# Patient Record
Sex: MALE | Race: WHITE | NOT HISPANIC OR LATINO | Employment: OTHER | URBAN - METROPOLITAN AREA
[De-identification: names, ages, dates, MRNs, and addresses within clinical notes are randomized per-mention and may not be internally consistent; named-entity substitution may affect disease eponyms.]

---

## 2019-02-05 ENCOUNTER — HOSPITAL ENCOUNTER (OUTPATIENT)
Dept: CARDIOLOGY | Facility: HOSPITAL | Age: 79
Discharge: HOME | End: 2019-02-05
Attending: SURGERY

## 2020-03-11 ENCOUNTER — HOSPITAL ENCOUNTER (OUTPATIENT)
Dept: CARDIOLOGY | Facility: HOSPITAL | Age: 80
Discharge: HOME | End: 2020-03-11
Attending: SURGERY

## 2020-03-31 ENCOUNTER — HOSPITAL ENCOUNTER (OUTPATIENT)
Dept: SURGERY | Facility: CLINIC | Age: 80
Discharge: HOME | End: 2020-03-31
Attending: UROLOGY

## 2020-03-31 ENCOUNTER — OFFICE VISIT CONVERTED (OUTPATIENT)
Dept: UROLOGY | Facility: CLINIC | Age: 80
End: 2020-03-31
Attending: UROLOGY

## 2020-04-05 LAB
ALBUMIN SERPL-MCNC: 4.2 G/DL (ref 3.5–5)
ALBUMIN/GLOB SERPL: 1.4 {RATIO} (ref 1.4–2.6)
ALP SERPL-CCNC: 95 U/L (ref 56–155)
ALT SERPL-CCNC: 10 U/L (ref 10–40)
ANION GAP SERPL CALC-SCNC: 19 MMOL/L (ref 8–19)
APPEARANCE UR: ABNORMAL
AST SERPL-CCNC: 14 U/L (ref 15–50)
BASOPHILS # BLD AUTO: 0.02 10*3/UL (ref 0–0.2)
BASOPHILS NFR BLD AUTO: 0.2 % (ref 0–3)
BILIRUB SERPL-MCNC: 0.66 MG/DL (ref 0.2–1.3)
BUN SERPL-MCNC: 18 MG/DL (ref 5–25)
BUN/CREAT SERPL: 18 {RATIO} (ref 6–20)
CALCIUM SERPL-MCNC: 9.5 MG/DL (ref 8.7–10.4)
CHLORIDE SERPL-SCNC: 88 MMOL/L (ref 99–111)
COLOR UR: ABNORMAL
CONV ABS IMM GRAN: 0.03 10*3/UL (ref 0–0.2)
CONV BACTERIA: NEGATIVE
CONV CO2: 25 MMOL/L (ref 22–32)
CONV COLLECTION SOURCE (UA): ABNORMAL
CONV IMMATURE GRAN: 0.3 % (ref 0–1.8)
CONV TOTAL PROTEIN: 7.2 G/DL (ref 6.3–8.2)
CREAT UR-MCNC: 0.98 MG/DL (ref 0.7–1.2)
DEPRECATED RDW RBC AUTO: 43.6 FL (ref 35.1–43.9)
EOSINOPHIL # BLD AUTO: 0.01 10*3/UL (ref 0–0.7)
EOSINOPHIL # BLD AUTO: 0.1 % (ref 0–7)
ERYTHROCYTE [DISTWIDTH] IN BLOOD BY AUTOMATED COUNT: 13.3 % (ref 11.6–14.4)
GFR SERPLBLD BASED ON 1.73 SQ M-ARVRAT: >60 ML/MIN/{1.73_M2}
GLOBULIN UR ELPH-MCNC: 3 G/DL (ref 2–3.5)
GLUCOSE SERPL-MCNC: 278 MG/DL (ref 70–99)
HCT VFR BLD AUTO: 43.8 % (ref 42–52)
HGB BLD-MCNC: 14.4 G/DL (ref 14–18)
LYMPHOCYTES # BLD AUTO: 0.96 10*3/UL (ref 1–5)
LYMPHOCYTES NFR BLD AUTO: 8.5 % (ref 20–45)
MCH RBC QN AUTO: 29.2 PG (ref 27–31)
MCHC RBC AUTO-ENTMCNC: 32.9 G/DL (ref 33–37)
MCV RBC AUTO: 88.8 FL (ref 80–96)
MONOCYTES # BLD AUTO: 0.62 10*3/UL (ref 0.2–1.2)
MONOCYTES NFR BLD AUTO: 5.5 % (ref 3–10)
NEUTROPHILS # BLD AUTO: 9.61 10*3/UL (ref 2–8)
NEUTROPHILS NFR BLD AUTO: 85.4 % (ref 30–85)
NRBC CBCN: 0 % (ref 0–0.7)
OSMOLALITY SERPL CALC.SUM OF ELEC: 276 MOSM/KG (ref 273–304)
PLATELET # BLD AUTO: 210 10*3/UL (ref 130–400)
PMV BLD AUTO: 10 FL (ref 9.4–12.4)
POTASSIUM SERPL-SCNC: 5.1 MMOL/L (ref 3.5–5.3)
RBC # BLD AUTO: 4.93 10*6/UL (ref 4.7–6.1)
RBC #/AREA URNS HPF: ABNORMAL /[HPF]
SODIUM SERPL-SCNC: 127 MMOL/L (ref 135–147)
SQUAMOUS SPT QL MICRO: ABNORMAL /[HPF]
WBC # BLD AUTO: 11.25 10*3/UL (ref 4.8–10.8)
WBC #/AREA URNS HPF: ABNORMAL /[HPF]

## 2020-04-06 ENCOUNTER — HOSPITAL ENCOUNTER (OUTPATIENT)
Dept: PERIOP | Facility: HOSPITAL | Age: 80
Setting detail: HOSPITAL OUTPATIENT SURGERY
Discharge: HOME | End: 2020-04-06
Attending: UROLOGY

## 2020-04-06 LAB
GLUCOSE BLD-MCNC: 204 MG/DL (ref 70–99)
GLUCOSE BLD-MCNC: 212 MG/DL (ref 70–99)

## 2020-04-07 LAB — BACTERIA UR CULT: NORMAL

## 2020-04-13 ENCOUNTER — OFFICE VISIT CONVERTED (OUTPATIENT)
Dept: UROLOGY | Facility: CLINIC | Age: 80
End: 2020-04-13
Attending: UROLOGY

## 2020-04-13 ENCOUNTER — CONVERSION ENCOUNTER (OUTPATIENT)
Dept: SURGERY | Facility: CLINIC | Age: 80
End: 2020-04-13

## 2020-04-15 ENCOUNTER — HOSPITAL ENCOUNTER (OUTPATIENT)
Dept: ONCOLOGY | Facility: HOSPITAL | Age: 80
Discharge: HOME | End: 2020-04-15
Attending: INTERNAL MEDICINE

## 2020-04-15 ENCOUNTER — OFFICE VISIT CONVERTED (OUTPATIENT)
Dept: ONCOLOGY | Facility: HOSPITAL | Age: 80
End: 2020-04-15
Attending: INTERNAL MEDICINE

## 2020-04-20 ENCOUNTER — HOSPITAL ENCOUNTER (OUTPATIENT)
Dept: RADIATION ONCOLOGY | Facility: HOSPITAL | Age: 80
Setting detail: RECURRING SERIES
Discharge: STILL ADMITTED OR ADMINISTRATIVE | End: 2020-05-06
Attending: RADIOLOGY

## 2020-04-20 ENCOUNTER — HOSPITAL ENCOUNTER (OUTPATIENT)
Dept: GENERAL RADIOLOGY | Facility: HOSPITAL | Age: 80
Discharge: HOME | End: 2020-04-20
Attending: INTERNAL MEDICINE

## 2020-04-24 ENCOUNTER — TELEPHONE CONVERTED (OUTPATIENT)
Dept: UROLOGY | Facility: CLINIC | Age: 80
End: 2020-04-24
Attending: UROLOGY

## 2020-04-28 LAB — Lab: NORMAL

## 2020-06-30 ENCOUNTER — OFFICE VISIT CONVERTED (OUTPATIENT)
Dept: UROLOGY | Facility: CLINIC | Age: 80
End: 2020-06-30
Attending: UROLOGY

## 2020-08-14 ENCOUNTER — OFFICE VISIT CONVERTED (OUTPATIENT)
Dept: CARDIOLOGY | Facility: CLINIC | Age: 80
End: 2020-08-14
Attending: INTERNAL MEDICINE

## 2020-09-08 ENCOUNTER — OFFICE VISIT CONVERTED (OUTPATIENT)
Dept: UROLOGY | Facility: CLINIC | Age: 80
End: 2020-09-08
Attending: UROLOGY

## 2020-09-29 ENCOUNTER — OFFICE VISIT CONVERTED (OUTPATIENT)
Dept: CARDIOLOGY | Facility: CLINIC | Age: 80
End: 2020-09-29
Attending: INTERNAL MEDICINE

## 2020-09-29 ENCOUNTER — CONVERSION ENCOUNTER (OUTPATIENT)
Dept: CARDIOLOGY | Facility: CLINIC | Age: 80
End: 2020-09-29

## 2020-10-05 ENCOUNTER — HOSPITAL ENCOUNTER (OUTPATIENT)
Dept: SURGERY | Facility: CLINIC | Age: 80
Discharge: HOME | End: 2020-10-05
Attending: UROLOGY

## 2020-10-05 ENCOUNTER — PROCEDURE VISIT CONVERTED (OUTPATIENT)
Dept: UROLOGY | Facility: CLINIC | Age: 80
End: 2020-10-05
Attending: UROLOGY

## 2020-10-08 LAB
AMPICILLIN SUSC ISLT: >=32
AMPICILLIN+SULBAC SUSC ISLT: >=32
BACTERIA UR CULT: ABNORMAL
CEFAZOLIN SUSC ISLT: <=4
CEFEPIME SUSC ISLT: <=0.12
CEFTAZIDIME SUSC ISLT: <=1
CEFTRIAXONE SUSC ISLT: <=0.25
CIPROFLOXACIN SUSC ISLT: >=4
ERTAPENEM SUSC ISLT: <=0.12
GENTAMICIN SUSC ISLT: <=1
LEVOFLOXACIN SUSC ISLT: >=8
NITROFURANTOIN SUSC ISLT: <=16
PIP+TAZO SUSC ISLT: <=4
TMP SMX SUSC ISLT: <=20
TOBRAMYCIN SUSC ISLT: <=1

## 2020-10-17 ENCOUNTER — HOSPITAL ENCOUNTER (OUTPATIENT)
Dept: PREADMISSION TESTING | Facility: HOSPITAL | Age: 80
Discharge: HOME | End: 2020-10-17
Attending: UROLOGY

## 2020-10-18 LAB — SARS-COV-2 RNA SPEC QL NAA+PROBE: NOT DETECTED

## 2020-10-22 ENCOUNTER — HOSPITAL ENCOUNTER (OUTPATIENT)
Dept: PERIOP | Facility: HOSPITAL | Age: 80
Setting detail: HOSPITAL OUTPATIENT SURGERY
Discharge: HOME | End: 2020-10-22
Attending: UROLOGY

## 2020-10-22 LAB
GLUCOSE BLD-MCNC: 173 MG/DL (ref 70–99)
GLUCOSE BLD-MCNC: 183 MG/DL (ref 70–99)

## 2020-11-06 ENCOUNTER — TELEPHONE CONVERTED (OUTPATIENT)
Dept: UROLOGY | Facility: CLINIC | Age: 80
End: 2020-11-06
Attending: UROLOGY

## 2020-12-15 ENCOUNTER — HOSPITAL ENCOUNTER (OUTPATIENT)
Dept: ONCOLOGY | Facility: HOSPITAL | Age: 80
Discharge: HOME | End: 2020-12-15
Attending: INTERNAL MEDICINE

## 2020-12-15 ENCOUNTER — OFFICE VISIT CONVERTED (OUTPATIENT)
Dept: PULMONOLOGY | Facility: CLINIC | Age: 80
End: 2020-12-15
Attending: INTERNAL MEDICINE

## 2020-12-15 LAB
ALBUMIN SERPL-MCNC: 4.3 G/DL (ref 3.5–5)
ALBUMIN/GLOB SERPL: 1.6 {RATIO} (ref 1.4–2.6)
ALP SERPL-CCNC: 89 U/L (ref 56–155)
ALT SERPL-CCNC: 9 U/L (ref 10–40)
ANION GAP SERPL CALC-SCNC: 11 MMOL/L (ref 8–19)
AST SERPL-CCNC: 12 U/L (ref 15–50)
BASOPHILS # BLD AUTO: 0.02 10*3/UL (ref 0–0.2)
BASOPHILS NFR BLD AUTO: 0.2 % (ref 0–3)
BILIRUB SERPL-MCNC: 0.32 MG/DL (ref 0.2–1.3)
BUN SERPL-MCNC: 27 MG/DL (ref 5–25)
BUN/CREAT SERPL: 23 {RATIO} (ref 6–20)
CALCIUM SERPL-MCNC: 9.5 MG/DL (ref 8.7–10.4)
CHLORIDE SERPL-SCNC: 100 MMOL/L (ref 99–111)
CONV ABS IMM GRAN: 0.02 10*3/UL (ref 0–0.54)
CONV CO2: 26 MMOL/L (ref 22–32)
CONV EOSINOPHILS PERCENT BY MANUAL COUNT: 1.4 % (ref 0–7)
CONV IMMATURE GRAN: 0.2 % (ref 0–0.4)
CONV TOTAL PROTEIN: 7 G/DL (ref 6.3–8.2)
CREAT UR-MCNC: 1.18 MG/DL (ref 0.7–1.2)
EOSINOPHIL # BLD MANUAL: 0.15 10*3/UL (ref 0–0.7)
ERYTHROCYTE [DISTWIDTH] IN BLOOD BY AUTOMATED COUNT: 13.6 % (ref 11.5–14.5)
ERYTHROCYTE [DISTWIDTH] IN BLOOD BY AUTOMATED COUNT: 43.6 FL
GFR SERPLBLD BASED ON 1.73 SQ M-ARVRAT: 57 ML/MIN/{1.73_M2}
GLOBULIN UR ELPH-MCNC: 2.7 G/DL (ref 2–3.5)
GLUCOSE SERPL-MCNC: 241 MG/DL (ref 70–99)
HBA1C MFR BLD: 15 G/DL (ref 14–18)
HCT VFR BLD AUTO: 46.4 % (ref 42–52)
LYMPHOCYTES # BLD AUTO: 1.13 10*3/UL (ref 1–5)
LYMPHOCYTES NFR BLD AUTO: 10.2 % (ref 20–45)
MCH RBC QN AUTO: 28 PG (ref 27–31)
MCHC RBC AUTO-ENTMCNC: 32.3 G/DL (ref 33–37)
MCV RBC AUTO: 86.7 FL (ref 80–96)
MONOCYTES # BLD AUTO: 0.68 10*3/UL (ref 0.2–1.2)
MONOCYTES NFR BLD MANUAL: 6.1 % (ref 3–10)
NEUTROPHILS # BLD AUTO: 9.1 10*3/UL (ref 2–8)
NEUTROPHILS NFR BLD MANUAL: 81.9 % (ref 30–85)
OSMOLALITY SERPL CALC.SUM OF ELEC: 287 MOSM/KG (ref 273–304)
PLATELET # BLD AUTO: 201 10*3/UL (ref 130–400)
PMV BLD AUTO: 9.8 FL (ref 7.4–10.4)
POTASSIUM SERPL-SCNC: 4.5 MMOL/L (ref 3.5–5.3)
RBC MORPH BLD: 5.35 10*6/UL (ref 4.7–6.1)
SODIUM SERPL-SCNC: 132 MMOL/L (ref 135–147)
TSH SERPL-ACNC: 2.56 M[IU]/L (ref 0.27–4.2)
WBC # BLD AUTO: 11.1 10*3/UL (ref 4.8–10.8)

## 2020-12-28 ENCOUNTER — HOSPITAL ENCOUNTER (OUTPATIENT)
Dept: OTHER | Facility: HOSPITAL | Age: 80
Setting detail: RECURRING SERIES
Discharge: HOME | End: 2021-03-28
Attending: INTERNAL MEDICINE

## 2020-12-30 LAB
ALBUMIN SERPL-MCNC: 3.7 G/DL (ref 3.5–5)
ALBUMIN/GLOB SERPL: 1.3 {RATIO} (ref 1.4–2.6)
ALP SERPL-CCNC: 82 U/L (ref 56–155)
ALT SERPL-CCNC: 7 U/L (ref 10–40)
ANION GAP SERPL CALC-SCNC: 11 MMOL/L (ref 8–19)
AST SERPL-CCNC: 11 U/L (ref 15–50)
BASOPHILS # BLD AUTO: 0.03 10*3/UL (ref 0–0.2)
BASOPHILS NFR BLD AUTO: 0.4 % (ref 0–3)
BILIRUB SERPL-MCNC: 0.38 MG/DL (ref 0.2–1.3)
BUN SERPL-MCNC: 18 MG/DL (ref 5–25)
BUN/CREAT SERPL: 18 {RATIO} (ref 6–20)
CALCIUM SERPL-MCNC: 9.2 MG/DL (ref 8.7–10.4)
CHLORIDE SERPL-SCNC: 98 MMOL/L (ref 99–111)
CONV ABS IMM GRAN: 0.01 10*3/UL (ref 0–0.54)
CONV CO2: 25 MMOL/L (ref 22–32)
CONV EOSINOPHILS PERCENT BY MANUAL COUNT: 2.6 % (ref 0–7)
CONV IMMATURE GRAN: 0.1 % (ref 0–0.4)
CONV TOTAL PROTEIN: 6.6 G/DL (ref 6.3–8.2)
CREAT UR-MCNC: 1.02 MG/DL (ref 0.7–1.2)
EOSINOPHIL # BLD MANUAL: 0.2 10*3/UL (ref 0–0.7)
ERYTHROCYTE [DISTWIDTH] IN BLOOD BY AUTOMATED COUNT: 14 % (ref 11.5–14.5)
ERYTHROCYTE [DISTWIDTH] IN BLOOD BY AUTOMATED COUNT: 45.1 FL
GFR SERPLBLD BASED ON 1.73 SQ M-ARVRAT: >60 ML/MIN/{1.73_M2}
GLOBULIN UR ELPH-MCNC: 2.9 G/DL (ref 2–3.5)
GLUCOSE SERPL-MCNC: 254 MG/DL (ref 70–99)
HBA1C MFR BLD: 14.1 G/DL (ref 14–18)
HCT VFR BLD AUTO: 42.9 % (ref 42–52)
LYMPHOCYTES # BLD AUTO: 1.61 10*3/UL (ref 1–5)
LYMPHOCYTES NFR BLD AUTO: 21.1 % (ref 20–45)
MCH RBC QN AUTO: 28.7 PG (ref 27–31)
MCHC RBC AUTO-ENTMCNC: 32.9 G/DL (ref 33–37)
MCV RBC AUTO: 87.4 FL (ref 80–96)
MONOCYTES # BLD AUTO: 0.76 10*3/UL (ref 0.2–1.2)
MONOCYTES NFR BLD MANUAL: 10 % (ref 3–10)
NEUTROPHILS # BLD AUTO: 5.02 10*3/UL (ref 2–8)
NEUTROPHILS NFR BLD MANUAL: 65.8 % (ref 30–85)
OSMOLALITY SERPL CALC.SUM OF ELEC: 281 MOSM/KG (ref 273–304)
PLATELET # BLD AUTO: 165 10*3/UL (ref 130–400)
PMV BLD AUTO: 9.7 FL (ref 7.4–10.4)
POTASSIUM SERPL-SCNC: 4.4 MMOL/L (ref 3.5–5.3)
RBC MORPH BLD: 4.91 10*6/UL (ref 4.7–6.1)
SODIUM SERPL-SCNC: 130 MMOL/L (ref 135–147)
T4 FREE SERPL-MCNC: 1.1 NG/DL (ref 0.9–1.8)
TSH SERPL-ACNC: 4.75 M[IU]/L (ref 0.27–4.2)
WBC # BLD AUTO: 7.63 10*3/UL (ref 4.8–10.8)

## 2021-01-01 ENCOUNTER — LAB (OUTPATIENT)
Dept: LAB | Facility: HOSPITAL | Age: 81
End: 2021-01-01

## 2021-01-01 ENCOUNTER — HOSPITAL ENCOUNTER (OUTPATIENT)
Dept: CT IMAGING | Facility: HOSPITAL | Age: 81
Discharge: HOME | End: 2021-03-23
Attending: SURGERY

## 2021-01-01 ENCOUNTER — HOSPITAL ENCOUNTER (EMERGENCY)
Facility: HOSPITAL | Age: 81
Discharge: HOME | End: 2021-10-28
Attending: EMERGENCY MEDICINE | Admitting: EMERGENCY MEDICINE

## 2021-01-01 ENCOUNTER — ANESTHESIA EVENT (OUTPATIENT)
Dept: PERIOP | Facility: HOSPITAL | Age: 81
End: 2021-01-01

## 2021-01-01 ENCOUNTER — PROCEDURE VISIT (OUTPATIENT)
Dept: CARDIAC REHAB | Facility: HOSPITAL | Age: 81
End: 2021-01-01

## 2021-01-01 ENCOUNTER — HOSPITAL ENCOUNTER (OUTPATIENT)
Dept: CARDIOLOGY | Facility: HOSPITAL | Age: 81
Discharge: HOME | End: 2021-03-17
Attending: SURGERY

## 2021-01-01 ENCOUNTER — OFFICE VISIT CONVERTED (OUTPATIENT)
Dept: PULMONOLOGY | Facility: CLINIC | Age: 81
End: 2021-01-01
Attending: INTERNAL MEDICINE

## 2021-01-01 ENCOUNTER — HOSPITAL ENCOUNTER (OUTPATIENT)
Dept: CARDIOLOGY | Facility: HOSPITAL | Age: 81
Discharge: HOME | End: 2021-07-28

## 2021-01-01 ENCOUNTER — ANESTHESIA (OUTPATIENT)
Dept: PERIOP | Facility: HOSPITAL | Age: 81
End: 2021-01-01

## 2021-01-01 ENCOUNTER — OFFICE VISIT (OUTPATIENT)
Dept: UROLOGY | Facility: CLINIC | Age: 81
End: 2021-01-01

## 2021-01-01 ENCOUNTER — OFFICE VISIT CONVERTED (OUTPATIENT)
Dept: PULMONOLOGY | Facility: CLINIC | Age: 81
End: 2021-01-01
Attending: NURSE PRACTITIONER

## 2021-01-01 ENCOUNTER — HOSPITAL ENCOUNTER (OUTPATIENT)
Dept: OTHER | Facility: HOSPITAL | Age: 81
Discharge: HOME | End: 2021-04-05
Attending: INTERNAL MEDICINE

## 2021-01-01 ENCOUNTER — HOSPITAL ENCOUNTER (INPATIENT)
Facility: HOSPITAL | Age: 81
LOS: 1 days | Discharge: HOME | End: 2021-10-31
Attending: EMERGENCY MEDICINE | Admitting: STUDENT IN AN ORGANIZED HEALTH CARE EDUCATION/TRAINING PROGRAM

## 2021-01-01 ENCOUNTER — TELEPHONE (OUTPATIENT)
Dept: CARDIOLOGY | Facility: CLINIC | Age: 81
End: 2021-01-01

## 2021-01-01 ENCOUNTER — HOSPITAL ENCOUNTER (OUTPATIENT)
Dept: ONCOLOGY | Facility: HOSPITAL | Age: 81
Discharge: HOME | End: 2021-04-15
Attending: INTERNAL MEDICINE

## 2021-01-01 ENCOUNTER — OFFICE VISIT (OUTPATIENT)
Dept: CARDIOLOGY | Facility: CLINIC | Age: 81
End: 2021-01-01

## 2021-01-01 ENCOUNTER — HOSPITAL ENCOUNTER (OUTPATIENT)
Dept: RESPIRATORY THERAPY | Facility: HOSPITAL | Age: 81
Discharge: HOME | End: 2021-07-22

## 2021-01-01 ENCOUNTER — HOSPITAL ENCOUNTER (OUTPATIENT)
Dept: CARDIOLOGY | Facility: HOSPITAL | Age: 81
Discharge: HOME | End: 2021-04-12
Attending: SURGERY

## 2021-01-01 ENCOUNTER — TRANSCRIBE ORDERS (OUTPATIENT)
Dept: VASCULAR SURGERY | Facility: HOSPITAL | Age: 81
End: 2021-01-01

## 2021-01-01 ENCOUNTER — PROCEDURE VISIT CONVERTED (OUTPATIENT)
Dept: UROLOGY | Facility: CLINIC | Age: 81
End: 2021-01-01
Attending: UROLOGY

## 2021-01-01 ENCOUNTER — HOSPITAL ENCOUNTER (OUTPATIENT)
Dept: OTHER | Facility: HOSPITAL | Age: 81
Setting detail: RECURRING SERIES
Discharge: HOME | End: 2021-04-07
Attending: INTERNAL MEDICINE

## 2021-01-01 ENCOUNTER — OFFICE VISIT (OUTPATIENT)
Dept: PULMONOLOGY | Facility: CLINIC | Age: 81
End: 2021-01-01

## 2021-01-01 ENCOUNTER — HOSPITAL ENCOUNTER (OUTPATIENT)
Dept: ULTRASOUND IMAGING | Facility: HOSPITAL | Age: 81
Discharge: HOME | End: 2021-10-27

## 2021-01-01 ENCOUNTER — TELEPHONE (OUTPATIENT)
Dept: UROLOGY | Facility: CLINIC | Age: 81
End: 2021-01-01

## 2021-01-01 ENCOUNTER — HOSPITAL ENCOUNTER (OUTPATIENT)
Dept: OTHER | Facility: HOSPITAL | Age: 81
Discharge: HOME | End: 2021-05-28
Attending: NURSE PRACTITIONER

## 2021-01-01 ENCOUNTER — HOSPITAL ENCOUNTER (OUTPATIENT)
Dept: PREADMISSION TESTING | Facility: HOSPITAL | Age: 81
Discharge: HOME | End: 2021-03-18
Attending: SURGERY

## 2021-01-01 ENCOUNTER — TELEPHONE CONVERTED (OUTPATIENT)
Dept: UROLOGY | Facility: CLINIC | Age: 81
End: 2021-01-01
Attending: UROLOGY

## 2021-01-01 ENCOUNTER — HOSPITAL ENCOUNTER (OUTPATIENT)
Dept: GENERAL RADIOLOGY | Facility: HOSPITAL | Age: 81
Discharge: HOME | End: 2021-02-05
Attending: UROLOGY

## 2021-01-01 ENCOUNTER — TRANSCRIBE ORDERS (OUTPATIENT)
Dept: ADMINISTRATIVE | Facility: HOSPITAL | Age: 81
End: 2021-01-01

## 2021-01-01 ENCOUNTER — READMISSION MANAGEMENT (OUTPATIENT)
Dept: CALL CENTER | Facility: HOSPITAL | Age: 81
End: 2021-01-01

## 2021-01-01 ENCOUNTER — APPOINTMENT (OUTPATIENT)
Dept: RESPIRATORY THERAPY | Facility: HOSPITAL | Age: 81
End: 2021-01-01

## 2021-01-01 ENCOUNTER — OFFICE VISIT (OUTPATIENT)
Dept: VASCULAR SURGERY | Facility: HOSPITAL | Age: 81
End: 2021-01-01

## 2021-01-01 VITALS
HEART RATE: 70 BPM | WEIGHT: 213 LBS | SYSTOLIC BLOOD PRESSURE: 130 MMHG | BODY MASS INDEX: 28.85 KG/M2 | HEIGHT: 72 IN | DIASTOLIC BLOOD PRESSURE: 56 MMHG

## 2021-01-01 VITALS
BODY MASS INDEX: 30.41 KG/M2 | WEIGHT: 224.21 LBS | TEMPERATURE: 97.4 F | BODY MASS INDEX: 30.56 KG/M2 | OXYGEN SATURATION: 98 % | WEIGHT: 225.31 LBS | TEMPERATURE: 97 F | BODY MASS INDEX: 28.97 KG/M2 | RESPIRATION RATE: 20 BRPM | TEMPERATURE: 97.6 F | WEIGHT: 213.63 LBS | DIASTOLIC BLOOD PRESSURE: 70 MMHG | SYSTOLIC BLOOD PRESSURE: 145 MMHG | HEART RATE: 83 BPM | WEIGHT: 222.66 LBS | DIASTOLIC BLOOD PRESSURE: 54 MMHG | RESPIRATION RATE: 20 BRPM | SYSTOLIC BLOOD PRESSURE: 140 MMHG | RESPIRATION RATE: 24 BRPM | SYSTOLIC BLOOD PRESSURE: 152 MMHG | RESPIRATION RATE: 18 BRPM | HEART RATE: 75 BPM | SYSTOLIC BLOOD PRESSURE: 138 MMHG | HEART RATE: 70 BPM | TEMPERATURE: 97.8 F | BODY MASS INDEX: 30.2 KG/M2 | DIASTOLIC BLOOD PRESSURE: 52 MMHG | OXYGEN SATURATION: 95 % | OXYGEN SATURATION: 95 % | DIASTOLIC BLOOD PRESSURE: 66 MMHG | OXYGEN SATURATION: 94 % | HEART RATE: 64 BPM

## 2021-01-01 VITALS
TEMPERATURE: 97.4 F | HEART RATE: 77 BPM | SYSTOLIC BLOOD PRESSURE: 141 MMHG | RESPIRATION RATE: 18 BRPM | DIASTOLIC BLOOD PRESSURE: 72 MMHG | OXYGEN SATURATION: 98 %

## 2021-01-01 VITALS
SYSTOLIC BLOOD PRESSURE: 134 MMHG | WEIGHT: 207.01 LBS | RESPIRATION RATE: 16 BRPM | HEART RATE: 84 BPM | DIASTOLIC BLOOD PRESSURE: 67 MMHG | BODY MASS INDEX: 28.04 KG/M2 | OXYGEN SATURATION: 95 % | TEMPERATURE: 97.8 F | HEIGHT: 72 IN

## 2021-01-01 VITALS
RESPIRATION RATE: 20 BRPM | OXYGEN SATURATION: 97 % | TEMPERATURE: 97.3 F | HEART RATE: 66 BPM | SYSTOLIC BLOOD PRESSURE: 145 MMHG | WEIGHT: 218.48 LBS | DIASTOLIC BLOOD PRESSURE: 63 MMHG | SYSTOLIC BLOOD PRESSURE: 145 MMHG | BODY MASS INDEX: 29.9 KG/M2 | HEART RATE: 69 BPM | BODY MASS INDEX: 29.63 KG/M2 | WEIGHT: 220.46 LBS | OXYGEN SATURATION: 92 % | DIASTOLIC BLOOD PRESSURE: 54 MMHG | TEMPERATURE: 98 F | RESPIRATION RATE: 18 BRPM

## 2021-01-01 VITALS
HEIGHT: 69 IN | DIASTOLIC BLOOD PRESSURE: 53 MMHG | OXYGEN SATURATION: 98 % | BODY MASS INDEX: 33.96 KG/M2 | RESPIRATION RATE: 20 BRPM | SYSTOLIC BLOOD PRESSURE: 153 MMHG | WEIGHT: 229.28 LBS | HEART RATE: 70 BPM | TEMPERATURE: 98.1 F

## 2021-01-01 VITALS — BODY MASS INDEX: 28.04 KG/M2 | WEIGHT: 207 LBS | RESPIRATION RATE: 16 BRPM | HEIGHT: 72 IN

## 2021-01-01 VITALS
DIASTOLIC BLOOD PRESSURE: 43 MMHG | RESPIRATION RATE: 14 BRPM | BODY MASS INDEX: 28.31 KG/M2 | SYSTOLIC BLOOD PRESSURE: 123 MMHG | WEIGHT: 209 LBS | HEIGHT: 72 IN | OXYGEN SATURATION: 96 % | HEART RATE: 66 BPM

## 2021-01-01 VITALS — HEIGHT: 72 IN | BODY MASS INDEX: 31.83 KG/M2 | RESPIRATION RATE: 17 BRPM | WEIGHT: 235 LBS

## 2021-01-01 VITALS
RESPIRATION RATE: 17 BRPM | DIASTOLIC BLOOD PRESSURE: 60 MMHG | OXYGEN SATURATION: 97 % | BODY MASS INDEX: 30.66 KG/M2 | SYSTOLIC BLOOD PRESSURE: 116 MMHG | TEMPERATURE: 96.6 F | WEIGHT: 219 LBS | HEART RATE: 68 BPM | HEIGHT: 71 IN

## 2021-01-01 VITALS
HEART RATE: 73 BPM | BODY MASS INDEX: 29.29 KG/M2 | TEMPERATURE: 97.7 F | SYSTOLIC BLOOD PRESSURE: 125 MMHG | DIASTOLIC BLOOD PRESSURE: 50 MMHG | OXYGEN SATURATION: 94 % | RESPIRATION RATE: 18 BRPM | WEIGHT: 216.27 LBS | HEIGHT: 72 IN

## 2021-01-01 VITALS — RESPIRATION RATE: 18 BRPM | WEIGHT: 235.5 LBS | BODY MASS INDEX: 31.9 KG/M2 | HEIGHT: 72 IN

## 2021-01-01 VITALS — WEIGHT: 235 LBS | HEIGHT: 72 IN | RESPIRATION RATE: 16 BRPM | BODY MASS INDEX: 31.83 KG/M2

## 2021-01-01 VITALS
HEART RATE: 66 BPM | DIASTOLIC BLOOD PRESSURE: 70 MMHG | WEIGHT: 224 LBS | BODY MASS INDEX: 30.34 KG/M2 | SYSTOLIC BLOOD PRESSURE: 158 MMHG | HEIGHT: 72 IN

## 2021-01-01 VITALS
HEART RATE: 70 BPM | BODY MASS INDEX: 30.48 KG/M2 | HEIGHT: 72 IN | DIASTOLIC BLOOD PRESSURE: 58 MMHG | WEIGHT: 225 LBS | SYSTOLIC BLOOD PRESSURE: 168 MMHG

## 2021-01-01 DIAGNOSIS — Z53.21 PATIENT LEFT WITHOUT BEING SEEN: Primary | ICD-10-CM

## 2021-01-01 DIAGNOSIS — J45.909 ASTHMA, UNSPECIFIED ASTHMA SEVERITY, UNSPECIFIED WHETHER COMPLICATED, UNSPECIFIED WHETHER PERSISTENT: Primary | ICD-10-CM

## 2021-01-01 DIAGNOSIS — N28.9 ACUTE RENAL INSUFFICIENCY: ICD-10-CM

## 2021-01-01 DIAGNOSIS — I10 ESSENTIAL HYPERTENSION: ICD-10-CM

## 2021-01-01 DIAGNOSIS — C67.9 MALIGNANT NEOPLASM OF URINARY BLADDER, UNSPECIFIED SITE (HCC): Primary | ICD-10-CM

## 2021-01-01 DIAGNOSIS — Z95.1 S/P CABG X 5: ICD-10-CM

## 2021-01-01 DIAGNOSIS — I65.23 BILATERAL CAROTID ARTERY STENOSIS: ICD-10-CM

## 2021-01-01 DIAGNOSIS — I65.23 BILATERAL CAROTID ARTERY STENOSIS: Primary | ICD-10-CM

## 2021-01-01 DIAGNOSIS — R26.2 DIFFICULTY IN WALKING: ICD-10-CM

## 2021-01-01 DIAGNOSIS — J45.909 ASTHMA, UNSPECIFIED ASTHMA SEVERITY, UNSPECIFIED WHETHER COMPLICATED, UNSPECIFIED WHETHER PERSISTENT: ICD-10-CM

## 2021-01-01 DIAGNOSIS — E78.2 MIXED HYPERLIPIDEMIA: ICD-10-CM

## 2021-01-01 DIAGNOSIS — C67.9 MALIGNANT NEOPLASM OF URINARY BLADDER, UNSPECIFIED SITE (HCC): ICD-10-CM

## 2021-01-01 DIAGNOSIS — I25.10 CORONARY ARTERY DISEASE INVOLVING NATIVE CORONARY ARTERY OF NATIVE HEART WITHOUT ANGINA PECTORIS: Primary | ICD-10-CM

## 2021-01-01 DIAGNOSIS — J45.909 ASTHMA IN ADULT WITHOUT COMPLICATION, UNSPECIFIED ASTHMA SEVERITY, UNSPECIFIED WHETHER PERSISTENT: Primary | ICD-10-CM

## 2021-01-01 DIAGNOSIS — R31.0 GROSS HEMATURIA: ICD-10-CM

## 2021-01-01 DIAGNOSIS — D62 ACUTE BLOOD LOSS ANEMIA: ICD-10-CM

## 2021-01-01 DIAGNOSIS — Z95.5 S/P CORONARY ARTERY STENT PLACEMENT: ICD-10-CM

## 2021-01-01 DIAGNOSIS — R31.9 HEMATURIA, UNSPECIFIED TYPE: ICD-10-CM

## 2021-01-01 DIAGNOSIS — R31.0 GROSS HEMATURIA: Primary | ICD-10-CM

## 2021-01-01 DIAGNOSIS — Z85.51 PERSONAL HISTORY OF BLADDER CANCER: ICD-10-CM

## 2021-01-01 LAB
ABO GROUP BLD: NORMAL
ABO GROUP BLD: NORMAL
ALBUMIN SERPL-MCNC: 3.5 G/DL (ref 3.5–5.2)
ALBUMIN SERPL-MCNC: 3.7 G/DL (ref 3.5–5)
ALBUMIN SERPL-MCNC: 3.9 G/DL (ref 3.5–5.2)
ALBUMIN SERPL-MCNC: 4 G/DL (ref 3.5–5)
ALBUMIN/GLOB SERPL: 1.1 {RATIO} (ref 1.4–2.6)
ALBUMIN/GLOB SERPL: 1.1 {RATIO} (ref 1.4–2.6)
ALBUMIN/GLOB SERPL: 1.3 G/DL
ALBUMIN/GLOB SERPL: 1.3 {RATIO} (ref 1.4–2.6)
ALBUMIN/GLOB SERPL: 1.4 G/DL
ALBUMIN/GLOB SERPL: 1.5 {RATIO} (ref 1.4–2.6)
ALP SERPL-CCNC: 67 U/L (ref 39–117)
ALP SERPL-CCNC: 72 U/L (ref 39–117)
ALP SERPL-CCNC: 82 U/L (ref 56–155)
ALP SERPL-CCNC: 86 U/L (ref 56–155)
ALP SERPL-CCNC: 89 U/L (ref 56–155)
ALP SERPL-CCNC: 94 U/L (ref 56–155)
ALT SERPL W P-5'-P-CCNC: 6 U/L (ref 1–41)
ALT SERPL W P-5'-P-CCNC: 7 U/L (ref 1–41)
ALT SERPL-CCNC: 10 U/L (ref 10–40)
ALT SERPL-CCNC: 12 U/L (ref 10–40)
ALT SERPL-CCNC: 12 U/L (ref 10–40)
ALT SERPL-CCNC: 13 U/L (ref 10–40)
ANION GAP SERPL CALC-SCNC: 12 MMOL/L (ref 8–19)
ANION GAP SERPL CALC-SCNC: 12 MMOL/L (ref 8–19)
ANION GAP SERPL CALC-SCNC: 13 MMOL/L (ref 8–19)
ANION GAP SERPL CALC-SCNC: 18 MMOL/L (ref 8–19)
ANION GAP SERPL CALCULATED.3IONS-SCNC: 11.5 MMOL/L (ref 5–15)
ANION GAP SERPL CALCULATED.3IONS-SCNC: 12 MMOL/L (ref 5–15)
ANION GAP SERPL CALCULATED.3IONS-SCNC: 12 MMOL/L (ref 5–15)
ANION GAP SERPL CALCULATED.3IONS-SCNC: 15 MMOL/L (ref 5–15)
APTT BLD: 25.6 S (ref 22.2–34.2)
APTT PPP: 23.4 SECONDS (ref 22.2–34.2)
AST SERPL-CCNC: 12 U/L (ref 1–40)
AST SERPL-CCNC: 12 U/L (ref 1–40)
AST SERPL-CCNC: 14 U/L (ref 15–50)
AST SERPL-CCNC: 18 U/L (ref 15–50)
AST SERPL-CCNC: 18 U/L (ref 15–50)
AST SERPL-CCNC: 23 U/L (ref 15–50)
BACTERIA SPEC AEROBE CULT: NO GROWTH
BACTERIA SPEC AEROBE CULT: NORMAL
BACTERIA SPEC AEROBE CULT: NORMAL
BACTERIA UR QL AUTO: ABNORMAL /HPF
BASOPHILS # BLD AUTO: 0.01 10*3/MM3 (ref 0–0.2)
BASOPHILS # BLD AUTO: 0.02 10*3/MM3 (ref 0–0.2)
BASOPHILS # BLD AUTO: 0.03 10*3/UL (ref 0–0.2)
BASOPHILS # BLD AUTO: 0.03 10*3/UL (ref 0–0.2)
BASOPHILS # BLD AUTO: 0.04 10*3/MM3 (ref 0–0.2)
BASOPHILS # BLD AUTO: 0.04 10*3/UL (ref 0–0.2)
BASOPHILS # BLD AUTO: 0.07 10*3/UL (ref 0–0.2)
BASOPHILS NFR BLD AUTO: 0.1 % (ref 0–1.5)
BASOPHILS NFR BLD AUTO: 0.2 % (ref 0–1.5)
BASOPHILS NFR BLD AUTO: 0.4 % (ref 0–1.5)
BASOPHILS NFR BLD AUTO: 0.4 % (ref 0–3)
BASOPHILS NFR BLD AUTO: 0.4 % (ref 0–3)
BASOPHILS NFR BLD AUTO: 0.6 % (ref 0–3)
BASOPHILS NFR BLD AUTO: 1 % (ref 0–3)
BH CV XLRA MEAS LEFT CAROTID BULB EDV: 11 CM/SEC
BH CV XLRA MEAS LEFT CAROTID BULB PSV: 78 CM/SEC
BH CV XLRA MEAS LEFT DIST CCA EDV: 13 CM/SEC
BH CV XLRA MEAS LEFT DIST CCA PSV: 101 CM/SEC
BH CV XLRA MEAS LEFT DIST ICA EDV: 24 CM/SEC
BH CV XLRA MEAS LEFT DIST ICA PSV: 100 CM/SEC
BH CV XLRA MEAS LEFT MID ICA EDV: 17 CM/SEC
BH CV XLRA MEAS LEFT MID ICA PSV: 100 CM/SEC
BH CV XLRA MEAS LEFT PROX CCA EDV: 9 CM/SEC
BH CV XLRA MEAS LEFT PROX CCA PSV: 103 CM/SEC
BH CV XLRA MEAS LEFT PROX ECA EDV: 11 CM/SEC
BH CV XLRA MEAS LEFT PROX ECA PSV: 119 CM/SEC
BH CV XLRA MEAS LEFT PROX ICA EDV: 17 CM/SEC
BH CV XLRA MEAS LEFT PROX ICA PSV: 103 CM/SEC
BH CV XLRA MEAS LEFT VERTEBRAL A EDV: 9 CM/SEC
BH CV XLRA MEAS LEFT VERTEBRAL A PSV: 39 CM/SEC
BH CV XLRA MEAS RIGHT CAROTID BULB EDV: 9 CM/SEC
BH CV XLRA MEAS RIGHT CAROTID BULB PSV: 47 CM/SEC
BH CV XLRA MEAS RIGHT DIST CCA EDV: 14 CM/SEC
BH CV XLRA MEAS RIGHT DIST CCA PSV: 81 CM/SEC
BH CV XLRA MEAS RIGHT DIST ICA EDV: 14 CM/SEC
BH CV XLRA MEAS RIGHT DIST ICA PSV: 76 CM/SEC
BH CV XLRA MEAS RIGHT MID ICA EDV: 14 CM/SEC
BH CV XLRA MEAS RIGHT MID ICA PSV: 65 CM/SEC
BH CV XLRA MEAS RIGHT PROX CCA EDV: 12 CM/SEC
BH CV XLRA MEAS RIGHT PROX CCA PSV: 98 CM/SEC
BH CV XLRA MEAS RIGHT PROX ECA EDV: 13 CM/SEC
BH CV XLRA MEAS RIGHT PROX ECA PSV: 103 CM/SEC
BH CV XLRA MEAS RIGHT PROX ICA EDV: 9 CM/SEC
BH CV XLRA MEAS RIGHT PROX ICA PSV: 35 CM/SEC
BH CV XLRA MEAS RIGHT VERTEBRAL A EDV: 12 CM/SEC
BH CV XLRA MEAS RIGHT VERTEBRAL A PSV: 48 CM/SEC
BILIRUB BLD-MCNC: NEGATIVE MG/DL
BILIRUB SERPL-MCNC: 0.2 MG/DL (ref 0–1.2)
BILIRUB SERPL-MCNC: 0.31 MG/DL (ref 0.2–1.3)
BILIRUB SERPL-MCNC: 0.31 MG/DL (ref 0.2–1.3)
BILIRUB SERPL-MCNC: 0.33 MG/DL (ref 0.2–1.3)
BILIRUB SERPL-MCNC: 0.35 MG/DL (ref 0.2–1.3)
BILIRUB SERPL-MCNC: 0.4 MG/DL (ref 0–1.2)
BILIRUB UR QL STRIP: ABNORMAL
BLD GP AB SCN SERPL QL: NEGATIVE
BUN SERPL-MCNC: 18 MG/DL (ref 5–25)
BUN SERPL-MCNC: 19 MG/DL (ref 5–25)
BUN SERPL-MCNC: 20 MG/DL (ref 5–25)
BUN SERPL-MCNC: 23 MG/DL (ref 5–25)
BUN SERPL-MCNC: 36 MG/DL (ref 8–23)
BUN SERPL-MCNC: 36 MG/DL (ref 8–23)
BUN SERPL-MCNC: 39 MG/DL (ref 8–23)
BUN SERPL-MCNC: 40 MG/DL (ref 8–23)
BUN/CREAT SERPL: 16 {RATIO} (ref 6–20)
BUN/CREAT SERPL: 17 {RATIO} (ref 6–20)
BUN/CREAT SERPL: 19 {RATIO} (ref 6–20)
BUN/CREAT SERPL: 19 {RATIO} (ref 6–20)
BUN/CREAT SERPL: 19.6 (ref 7–25)
BUN/CREAT SERPL: 19.7 (ref 7–25)
BUN/CREAT SERPL: 19.9 (ref 7–25)
BUN/CREAT SERPL: 20.3 (ref 7–25)
CALCIUM SERPL-MCNC: 9 MG/DL (ref 8.7–10.4)
CALCIUM SERPL-MCNC: 9.4 MG/DL (ref 8.7–10.4)
CALCIUM SERPL-MCNC: 9.6 MG/DL (ref 8.7–10.4)
CALCIUM SERPL-MCNC: 9.7 MG/DL (ref 8.7–10.4)
CALCIUM SPEC-SCNC: 8.4 MG/DL (ref 8.6–10.5)
CALCIUM SPEC-SCNC: 8.6 MG/DL (ref 8.6–10.5)
CALCIUM SPEC-SCNC: 9.1 MG/DL (ref 8.6–10.5)
CALCIUM SPEC-SCNC: 9.3 MG/DL (ref 8.6–10.5)
CHLORIDE SERPL-SCNC: 100 MMOL/L (ref 99–111)
CHLORIDE SERPL-SCNC: 100 MMOL/L (ref 99–111)
CHLORIDE SERPL-SCNC: 89 MMOL/L (ref 98–107)
CHLORIDE SERPL-SCNC: 90 MMOL/L (ref 98–107)
CHLORIDE SERPL-SCNC: 94 MMOL/L (ref 99–111)
CHLORIDE SERPL-SCNC: 96 MMOL/L (ref 98–107)
CHLORIDE SERPL-SCNC: 97 MMOL/L (ref 98–107)
CHLORIDE SERPL-SCNC: 98 MMOL/L (ref 99–111)
CLARITY UR: ABNORMAL
CLARITY, POC: CLEAR
CO2 SERPL-SCNC: 19 MMOL/L (ref 22–29)
CO2 SERPL-SCNC: 20 MMOL/L (ref 22–29)
CO2 SERPL-SCNC: 21.5 MMOL/L (ref 22–29)
CO2 SERPL-SCNC: 26 MMOL/L (ref 22–29)
COLOR UR: ABNORMAL
COLOR UR: YELLOW
CONV ABS IMM GRAN: 0.01 10*3/UL (ref 0–0.54)
CONV ABS IMM GRAN: 0.01 10*3/UL (ref 0–0.54)
CONV ABS IMM GRAN: 0.03 10*3/UL (ref 0–0.54)
CONV ABS IMM GRAN: 0.04 10*3/UL (ref 0–0.2)
CONV CO2: 26 MMOL/L (ref 22–32)
CONV CO2: 27 MMOL/L (ref 22–32)
CONV EOSINOPHILS PERCENT BY MANUAL COUNT: 6.9 % (ref 0–7)
CONV EOSINOPHILS PERCENT BY MANUAL COUNT: 6.9 % (ref 0–7)
CONV EOSINOPHILS PERCENT BY MANUAL COUNT: 7.4 % (ref 0–7)
CONV IMMATURE GRAN: 0.1 % (ref 0–0.4)
CONV IMMATURE GRAN: 0.1 % (ref 0–0.4)
CONV IMMATURE GRAN: 0.4 % (ref 0–0.4)
CONV IMMATURE GRAN: 0.5 % (ref 0–1.8)
CONV TOTAL PROTEIN: 6.6 G/DL (ref 6.3–8.2)
CONV TOTAL PROTEIN: 7.1 G/DL (ref 6.3–8.2)
CONV TOTAL PROTEIN: 7.2 G/DL (ref 6.3–8.2)
CONV TOTAL PROTEIN: 7.6 G/DL (ref 6.3–8.2)
CREAT BLD-MCNC: 1.1 MG/DL (ref 0.6–1.4)
CREAT SERPL-MCNC: 1.81 MG/DL (ref 0.76–1.27)
CREAT SERPL-MCNC: 1.84 MG/DL (ref 0.76–1.27)
CREAT SERPL-MCNC: 1.92 MG/DL (ref 0.76–1.27)
CREAT SERPL-MCNC: 2.03 MG/DL (ref 0.76–1.27)
CREAT UR-MCNC: 1.01 MG/DL (ref 0.7–1.2)
CREAT UR-MCNC: 1.13 MG/DL (ref 0.7–1.2)
CREAT UR-MCNC: 1.18 MG/DL (ref 0.7–1.2)
CREAT UR-MCNC: 1.2 MG/DL (ref 0.7–1.2)
DEPRECATED RDW RBC AUTO: 41.9 FL (ref 37–54)
DEPRECATED RDW RBC AUTO: 42.6 FL (ref 37–54)
DEPRECATED RDW RBC AUTO: 43 FL (ref 37–54)
DEPRECATED RDW RBC AUTO: 47 FL (ref 35.1–43.9)
EOSINOPHIL # BLD AUTO: 0 10*3/MM3 (ref 0–0.4)
EOSINOPHIL # BLD AUTO: 0.1 10*3/MM3 (ref 0–0.4)
EOSINOPHIL # BLD AUTO: 0.17 10*3/MM3 (ref 0–0.4)
EOSINOPHIL # BLD AUTO: 0.58 10*3/UL (ref 0–0.7)
EOSINOPHIL # BLD AUTO: 6.8 % (ref 0–7)
EOSINOPHIL # BLD MANUAL: 0.49 10*3/UL (ref 0–0.7)
EOSINOPHIL # BLD MANUAL: 0.51 10*3/UL (ref 0–0.7)
EOSINOPHIL # BLD MANUAL: 0.51 10*3/UL (ref 0–0.7)
EOSINOPHIL NFR BLD AUTO: 0 % (ref 0.3–6.2)
EOSINOPHIL NFR BLD AUTO: 0.9 % (ref 0.3–6.2)
EOSINOPHIL NFR BLD AUTO: 1.6 % (ref 0.3–6.2)
ERYTHROCYTE [DISTWIDTH] IN BLOOD BY AUTOMATED COUNT: 13.2 % (ref 12.3–15.4)
ERYTHROCYTE [DISTWIDTH] IN BLOOD BY AUTOMATED COUNT: 13.4 % (ref 12.3–15.4)
ERYTHROCYTE [DISTWIDTH] IN BLOOD BY AUTOMATED COUNT: 13.4 % (ref 12.3–15.4)
ERYTHROCYTE [DISTWIDTH] IN BLOOD BY AUTOMATED COUNT: 14.3 % (ref 11.5–14.5)
ERYTHROCYTE [DISTWIDTH] IN BLOOD BY AUTOMATED COUNT: 14.5 % (ref 11.5–14.5)
ERYTHROCYTE [DISTWIDTH] IN BLOOD BY AUTOMATED COUNT: 14.5 % (ref 11.5–14.5)
ERYTHROCYTE [DISTWIDTH] IN BLOOD BY AUTOMATED COUNT: 14.5 % (ref 11.6–14.4)
ERYTHROCYTE [DISTWIDTH] IN BLOOD BY AUTOMATED COUNT: 45.4 FL
ERYTHROCYTE [DISTWIDTH] IN BLOOD BY AUTOMATED COUNT: 46 FL
ERYTHROCYTE [DISTWIDTH] IN BLOOD BY AUTOMATED COUNT: 47.1 FL
EXPIRATION DATE: 822
GFR SERPL CREATININE-BSD FRML MDRD: 32 ML/MIN/1.73
GFR SERPL CREATININE-BSD FRML MDRD: 34 ML/MIN/1.73
GFR SERPL CREATININE-BSD FRML MDRD: 35 ML/MIN/1.73
GFR SERPL CREATININE-BSD FRML MDRD: 36 ML/MIN/1.73
GFR SERPLBLD BASED ON 1.73 SQ M-ARVRAT: 56 ML/MIN/{1.73_M2}
GFR SERPLBLD BASED ON 1.73 SQ M-ARVRAT: 57 ML/MIN/{1.73_M2}
GFR SERPLBLD BASED ON 1.73 SQ M-ARVRAT: >60 ML/MIN/{1.73_M2}
GLOBULIN UR ELPH-MCNC: 2.6 G/DL (ref 2–3.5)
GLOBULIN UR ELPH-MCNC: 2.8 GM/DL
GLOBULIN UR ELPH-MCNC: 2.8 GM/DL
GLOBULIN UR ELPH-MCNC: 3.2 G/DL (ref 2–3.5)
GLOBULIN UR ELPH-MCNC: 3.4 G/DL (ref 2–3.5)
GLOBULIN UR ELPH-MCNC: 3.6 G/DL (ref 2–3.5)
GLUCOSE BLDC GLUCOMTR-MCNC: 181 MG/DL (ref 70–99)
GLUCOSE BLDC GLUCOMTR-MCNC: 196 MG/DL (ref 70–99)
GLUCOSE BLDC GLUCOMTR-MCNC: 219 MG/DL (ref 70–99)
GLUCOSE BLDC GLUCOMTR-MCNC: 278 MG/DL (ref 70–99)
GLUCOSE BLDC GLUCOMTR-MCNC: 304 MG/DL (ref 70–99)
GLUCOSE SERPL-MCNC: 159 MG/DL (ref 70–99)
GLUCOSE SERPL-MCNC: 211 MG/DL (ref 70–99)
GLUCOSE SERPL-MCNC: 213 MG/DL (ref 65–99)
GLUCOSE SERPL-MCNC: 234 MG/DL (ref 65–99)
GLUCOSE SERPL-MCNC: 243 MG/DL (ref 70–99)
GLUCOSE SERPL-MCNC: 244 MG/DL (ref 65–99)
GLUCOSE SERPL-MCNC: 284 MG/DL (ref 65–99)
GLUCOSE SERPL-MCNC: 80 MG/DL (ref 70–99)
GLUCOSE UR STRIP-MCNC: ABNORMAL MG/DL
HBA1C MFR BLD: 13.6 G/DL (ref 14–18)
HBA1C MFR BLD: 14.8 G/DL (ref 14–18)
HBA1C MFR BLD: 14.9 G/DL (ref 14–18)
HCT VFR BLD AUTO: 26.8 % (ref 37.5–51)
HCT VFR BLD AUTO: 27.8 % (ref 37.5–51)
HCT VFR BLD AUTO: 29.8 % (ref 37.5–51)
HCT VFR BLD AUTO: 29.8 % (ref 37.5–51)
HCT VFR BLD AUTO: 30.3 % (ref 37.5–51)
HCT VFR BLD AUTO: 35.9 % (ref 37.5–51)
HCT VFR BLD AUTO: 41.2 % (ref 42–52)
HCT VFR BLD AUTO: 42.3 % (ref 42–52)
HCT VFR BLD AUTO: 43.9 % (ref 42–52)
HCT VFR BLD AUTO: 45.4 % (ref 42–52)
HGB BLD-MCNC: 10.4 G/DL (ref 13–17.7)
HGB BLD-MCNC: 12.2 G/DL (ref 13–17.7)
HGB BLD-MCNC: 13.4 G/DL (ref 14–18)
HGB BLD-MCNC: 9.2 G/DL (ref 13–17.7)
HGB BLD-MCNC: 9.5 G/DL (ref 13–17.7)
HGB BLD-MCNC: 9.8 G/DL (ref 13–17.7)
HGB BLD-MCNC: 9.8 G/DL (ref 13–17.7)
HGB UR QL STRIP.AUTO: ABNORMAL
HOLD SPECIMEN: NORMAL
HOLD SPECIMEN: NORMAL
HYALINE CASTS UR QL AUTO: ABNORMAL /LPF
IMM GRANULOCYTES # BLD AUTO: 0.03 10*3/MM3 (ref 0–0.05)
IMM GRANULOCYTES # BLD AUTO: 0.04 10*3/MM3 (ref 0–0.05)
IMM GRANULOCYTES # BLD AUTO: 0.1 10*3/MM3 (ref 0–0.05)
IMM GRANULOCYTES NFR BLD AUTO: 0.3 % (ref 0–0.5)
IMM GRANULOCYTES NFR BLD AUTO: 0.4 % (ref 0–0.5)
IMM GRANULOCYTES NFR BLD AUTO: 1.1 % (ref 0–0.5)
INR PPP: 0.89 (ref 2–3)
INR PPP: 0.9 (ref 2–3)
KETONES UR QL STRIP: ABNORMAL
KETONES UR QL: NEGATIVE
LEFT ARM BP: NORMAL MMHG
LEUKOCYTE EST, POC: ABNORMAL
LEUKOCYTE ESTERASE UR QL STRIP.AUTO: ABNORMAL
LYMPHOCYTES # BLD AUTO: 0.67 10*3/MM3 (ref 0.7–3.1)
LYMPHOCYTES # BLD AUTO: 0.91 10*3/MM3 (ref 0.7–3.1)
LYMPHOCYTES # BLD AUTO: 1.18 10*3/MM3 (ref 0.7–3.1)
LYMPHOCYTES # BLD AUTO: 1.33 10*3/UL (ref 1–5)
LYMPHOCYTES # BLD AUTO: 1.46 10*3/UL (ref 1–5)
LYMPHOCYTES # BLD AUTO: 1.64 10*3/UL (ref 1–5)
LYMPHOCYTES # BLD AUTO: 1.92 10*3/UL (ref 1–5)
LYMPHOCYTES NFR BLD AUTO: 10.8 % (ref 19.6–45.3)
LYMPHOCYTES NFR BLD AUTO: 19.2 % (ref 20–45)
LYMPHOCYTES NFR BLD AUTO: 19.4 % (ref 20–45)
LYMPHOCYTES NFR BLD AUTO: 19.8 % (ref 20–45)
LYMPHOCYTES NFR BLD AUTO: 26.9 % (ref 20–45)
LYMPHOCYTES NFR BLD AUTO: 7.1 % (ref 19.6–45.3)
LYMPHOCYTES NFR BLD AUTO: 8.8 % (ref 19.6–45.3)
Lab: ABNORMAL
MAGNESIUM SERPL-MCNC: 2 MG/DL (ref 1.6–2.4)
MAXIMAL PREDICTED HEART RATE: 138 BPM
MCH RBC QN AUTO: 28.2 PG (ref 27–31)
MCH RBC QN AUTO: 28.6 PG (ref 27–31)
MCH RBC QN AUTO: 28.7 PG (ref 26.6–33)
MCH RBC QN AUTO: 29 PG (ref 27–31)
MCH RBC QN AUTO: 29.2 PG (ref 27–31)
MCH RBC QN AUTO: 29.5 PG (ref 26.6–33)
MCH RBC QN AUTO: 29.7 PG (ref 26.6–33)
MCHC RBC AUTO-ENTMCNC: 31.7 G/DL (ref 33–37)
MCHC RBC AUTO-ENTMCNC: 32.6 G/DL (ref 33–37)
MCHC RBC AUTO-ENTMCNC: 32.9 G/DL (ref 31.5–35.7)
MCHC RBC AUTO-ENTMCNC: 33 G/DL (ref 33–37)
MCHC RBC AUTO-ENTMCNC: 33.9 G/DL (ref 33–37)
MCHC RBC AUTO-ENTMCNC: 34 G/DL (ref 31.5–35.7)
MCHC RBC AUTO-ENTMCNC: 34.2 G/DL (ref 31.5–35.7)
MCV RBC AUTO: 86.1 FL (ref 80–96)
MCV RBC AUTO: 86.3 FL (ref 79–97)
MCV RBC AUTO: 86.6 FL (ref 80–96)
MCV RBC AUTO: 87.3 FL (ref 79–97)
MCV RBC AUTO: 87.4 FL (ref 79–97)
MCV RBC AUTO: 87.8 FL (ref 80–96)
MCV RBC AUTO: 90.4 FL (ref 80–96)
MONOCYTES # BLD AUTO: 0.24 10*3/MM3 (ref 0.1–0.9)
MONOCYTES # BLD AUTO: 0.53 10*3/UL (ref 0.2–1.2)
MONOCYTES # BLD AUTO: 0.54 10*3/UL (ref 0.2–1.2)
MONOCYTES # BLD AUTO: 0.64 10*3/MM3 (ref 0.1–0.9)
MONOCYTES # BLD AUTO: 0.72 10*3/UL (ref 0.2–1.2)
MONOCYTES # BLD AUTO: 0.73 10*3/MM3 (ref 0.1–0.9)
MONOCYTES # BLD AUTO: 0.76 10*3/UL (ref 0.2–1.2)
MONOCYTES NFR BLD AUTO: 2.5 % (ref 5–12)
MONOCYTES NFR BLD AUTO: 6.2 % (ref 5–12)
MONOCYTES NFR BLD AUTO: 6.7 % (ref 5–12)
MONOCYTES NFR BLD AUTO: 8.4 % (ref 3–10)
MONOCYTES NFR BLD MANUAL: 10.6 % (ref 3–10)
MONOCYTES NFR BLD MANUAL: 7.2 % (ref 3–10)
MONOCYTES NFR BLD MANUAL: 7.9 % (ref 3–10)
NEUTROPHILS # BLD AUTO: 3.89 10*3/UL (ref 2–8)
NEUTROPHILS # BLD AUTO: 4.42 10*3/UL (ref 2–8)
NEUTROPHILS # BLD AUTO: 4.82 10*3/UL (ref 2–8)
NEUTROPHILS # BLD AUTO: 5.53 10*3/UL (ref 2–8)
NEUTROPHILS NFR BLD AUTO: 64.7 % (ref 30–85)
NEUTROPHILS NFR BLD AUTO: 8.48 10*3/MM3 (ref 1.7–7)
NEUTROPHILS NFR BLD AUTO: 8.59 10*3/MM3 (ref 1.7–7)
NEUTROPHILS NFR BLD AUTO: 8.89 10*3/MM3 (ref 1.7–7)
NEUTROPHILS NFR BLD AUTO: 81 % (ref 42.7–76)
NEUTROPHILS NFR BLD AUTO: 82.7 % (ref 42.7–76)
NEUTROPHILS NFR BLD AUTO: 89.2 % (ref 42.7–76)
NEUTROPHILS NFR BLD MANUAL: 54.5 % (ref 30–85)
NEUTROPHILS NFR BLD MANUAL: 64.6 % (ref 30–85)
NEUTROPHILS NFR BLD MANUAL: 65.3 % (ref 30–85)
NITRITE UR QL STRIP: ABNORMAL
NITRITE UR-MCNC: NEGATIVE MG/ML
NRBC BLD AUTO-RTO: 0 /100 WBC (ref 0–0.2)
NRBC CBCN: 0 % (ref 0–0.7)
NT-PROBNP SERPL-MCNC: 661.8 PG/ML (ref 0–1800)
OSMOLALITY SERPL CALC.SUM OF ELEC: 281 MOSM/KG (ref 273–304)
OSMOLALITY SERPL CALC.SUM OF ELEC: 282 MOSM/KG (ref 273–304)
OSMOLALITY SERPL CALC.SUM OF ELEC: 284 MOSM/KG (ref 273–304)
OSMOLALITY SERPL CALC.SUM OF ELEC: 286 MOSM/KG (ref 273–304)
OSMOLALITY SERPL: 277 MOSM/KG (ref 280–301)
PH UR STRIP.AUTO: ABNORMAL [PH]
PH UR: 6.5 [PH] (ref 5–8)
PHOSPHATE SERPL-MCNC: 4.3 MG/DL (ref 2.5–4.5)
PLATELET # BLD AUTO: 146 10*3/UL (ref 130–400)
PLATELET # BLD AUTO: 167 10*3/UL (ref 130–400)
PLATELET # BLD AUTO: 197 10*3/UL (ref 130–400)
PLATELET # BLD AUTO: 225 10*3/MM3 (ref 140–450)
PLATELET # BLD AUTO: 227 10*3/MM3 (ref 140–450)
PLATELET # BLD AUTO: 234 10*3/MM3 (ref 140–450)
PLATELET # BLD AUTO: 269 10*3/UL (ref 130–400)
PMV BLD AUTO: 10.2 FL (ref 7.4–10.4)
PMV BLD AUTO: 9.3 FL (ref 7.4–10.4)
PMV BLD AUTO: 9.5 FL (ref 7.4–10.4)
PMV BLD AUTO: 9.6 FL (ref 6–12)
PMV BLD AUTO: 9.8 FL (ref 6–12)
PMV BLD AUTO: 9.8 FL (ref 6–12)
PMV BLD AUTO: 9.9 FL (ref 9.4–12.4)
POTASSIUM SERPL-SCNC: 4.4 MMOL/L (ref 3.5–5.2)
POTASSIUM SERPL-SCNC: 4.4 MMOL/L (ref 3.5–5.3)
POTASSIUM SERPL-SCNC: 4.5 MMOL/L (ref 3.5–5.3)
POTASSIUM SERPL-SCNC: 4.7 MMOL/L (ref 3.5–5.2)
POTASSIUM SERPL-SCNC: 4.7 MMOL/L (ref 3.5–5.2)
POTASSIUM SERPL-SCNC: 5 MMOL/L (ref 3.5–5.3)
POTASSIUM SERPL-SCNC: 5 MMOL/L (ref 3.5–5.3)
POTASSIUM SERPL-SCNC: 5.4 MMOL/L (ref 3.5–5.2)
PROT SERPL-MCNC: 6.3 G/DL (ref 6–8.5)
PROT SERPL-MCNC: 6.7 G/DL (ref 6–8.5)
PROT UR QL STRIP: ABNORMAL
PROT UR STRIP-MCNC: ABNORMAL MG/DL
PROTHROMBIN TIME: 10 S (ref 9.4–12)
PROTHROMBIN TIME: 10.1 SECONDS (ref 9.4–12)
RBC # BLD AUTO: 3.22 10*6/MM3 (ref 4.14–5.8)
RBC # BLD AUTO: 3.41 10*6/MM3 (ref 4.14–5.8)
RBC # BLD AUTO: 4.11 10*6/MM3 (ref 4.14–5.8)
RBC # BLD AUTO: 4.68 10*6/UL (ref 4.7–6.1)
RBC # UR STRIP: ABNORMAL /UL
RBC # UR: ABNORMAL /HPF
RBC MORPH BLD: 4.69 10*6/UL (ref 4.7–6.1)
RBC MORPH BLD: 5.1 10*6/UL (ref 4.7–6.1)
RBC MORPH BLD: 5.24 10*6/UL (ref 4.7–6.1)
REF LAB TEST METHOD: ABNORMAL
RH BLD: NEGATIVE
RH BLD: NEGATIVE
RIGHT ARM BP: NORMAL MMHG
SARS-COV-2 RNA SPEC QL NAA+PROBE: NOT DETECTED
SODIUM SERPL-SCNC: 122 MMOL/L (ref 136–145)
SODIUM SERPL-SCNC: 123 MMOL/L (ref 136–145)
SODIUM SERPL-SCNC: 129 MMOL/L (ref 136–145)
SODIUM SERPL-SCNC: 132 MMOL/L (ref 135–147)
SODIUM SERPL-SCNC: 133 MMOL/L (ref 135–147)
SODIUM SERPL-SCNC: 133 MMOL/L (ref 135–147)
SODIUM SERPL-SCNC: 135 MMOL/L (ref 135–147)
SODIUM SERPL-SCNC: 135 MMOL/L (ref 136–145)
SP GR UR STRIP: 1.02 (ref 1–1.03)
SP GR UR STRIP: 1.02 (ref 1–1.03)
SP GR UR STRIP: ABNORMAL
SP GR UR: 1.02 (ref 1–1.03)
SQUAMOUS #/AREA URNS HPF: ABNORMAL /HPF
STRESS TARGET HR: 117 BPM
T&S EXPIRATION DATE: NORMAL
T4 FREE SERPL-MCNC: 1.1 NG/DL (ref 0.9–1.8)
T4 FREE SERPL-MCNC: 1.4 NG/DL (ref 0.9–1.8)
TSH SERPL-ACNC: 3.12 M[IU]/L (ref 0.27–4.2)
TSH SERPL-ACNC: 5.58 M[IU]/L (ref 0.27–4.2)
UROBILINOGEN UR QL STRIP: ABNORMAL
UROBILINOGEN UR QL: NORMAL
WBC # BLD AUTO: 10.38 10*3/MM3 (ref 3.4–10.8)
WBC # BLD AUTO: 10.96 10*3/MM3 (ref 3.4–10.8)
WBC # BLD AUTO: 6.85 10*3/UL (ref 4.8–10.8)
WBC # BLD AUTO: 7.14 10*3/UL (ref 4.8–10.8)
WBC # BLD AUTO: 7.38 10*3/UL (ref 4.8–10.8)
WBC # BLD AUTO: 8.54 10*3/UL (ref 4.8–10.8)
WBC # BLD AUTO: 9.5 10*3/MM3 (ref 3.4–10.8)
WBC UR QL AUTO: ABNORMAL /HPF
WHOLE BLOOD HOLD SPECIMEN: NORMAL
WHOLE BLOOD HOLD SPECIMEN: NORMAL

## 2021-01-01 PROCEDURE — 87086 URINE CULTURE/COLONY COUNT: CPT | Performed by: EMERGENCY MEDICINE

## 2021-01-01 PROCEDURE — 81003 URINALYSIS AUTO W/O SCOPE: CPT | Performed by: UROLOGY

## 2021-01-01 PROCEDURE — 86901 BLOOD TYPING SEROLOGIC RH(D): CPT

## 2021-01-01 PROCEDURE — 93880 EXTRACRANIAL BILAT STUDY: CPT

## 2021-01-01 PROCEDURE — 99284 EMERGENCY DEPT VISIT MOD MDM: CPT

## 2021-01-01 PROCEDURE — 94618 PULMONARY STRESS TESTING: CPT

## 2021-01-01 PROCEDURE — 0TCC8ZZ EXTIRPATION OF MATTER FROM BLADDER NECK, VIA NATURAL OR ARTIFICIAL OPENING ENDOSCOPIC: ICD-10-PCS | Performed by: UROLOGY

## 2021-01-01 PROCEDURE — 86901 BLOOD TYPING SEROLOGIC RH(D): CPT | Performed by: EMERGENCY MEDICINE

## 2021-01-01 PROCEDURE — 84100 ASSAY OF PHOSPHORUS: CPT | Performed by: UROLOGY

## 2021-01-01 PROCEDURE — 99221 1ST HOSP IP/OBS SF/LOW 40: CPT | Performed by: UROLOGY

## 2021-01-01 PROCEDURE — 52240 CYSTOSCOPY AND TREATMENT: CPT | Performed by: UROLOGY

## 2021-01-01 PROCEDURE — 51798 US URINE CAPACITY MEASURE: CPT

## 2021-01-01 PROCEDURE — 82962 GLUCOSE BLOOD TEST: CPT

## 2021-01-01 PROCEDURE — 99239 HOSP IP/OBS DSCHRG MGMT >30: CPT | Performed by: STUDENT IN AN ORGANIZED HEALTH CARE EDUCATION/TRAINING PROGRAM

## 2021-01-01 PROCEDURE — 99214 OFFICE O/P EST MOD 30 MIN: CPT | Performed by: INTERNAL MEDICINE

## 2021-01-01 PROCEDURE — 83735 ASSAY OF MAGNESIUM: CPT | Performed by: UROLOGY

## 2021-01-01 PROCEDURE — 99213 OFFICE O/P EST LOW 20 MIN: CPT | Performed by: UROLOGY

## 2021-01-01 PROCEDURE — 81001 URINALYSIS AUTO W/SCOPE: CPT | Performed by: EMERGENCY MEDICINE

## 2021-01-01 PROCEDURE — 25010000002 CEFTRIAXONE PER 250 MG: Performed by: UROLOGY

## 2021-01-01 PROCEDURE — 99213 OFFICE O/P EST LOW 20 MIN: CPT | Performed by: SURGERY

## 2021-01-01 PROCEDURE — 85730 THROMBOPLASTIN TIME PARTIAL: CPT | Performed by: EMERGENCY MEDICINE

## 2021-01-01 PROCEDURE — 99283 EMERGENCY DEPT VISIT LOW MDM: CPT

## 2021-01-01 PROCEDURE — 25010000002 HYDROMORPHONE 1 MG/ML SOLUTION: Performed by: NURSE ANESTHETIST, CERTIFIED REGISTERED

## 2021-01-01 PROCEDURE — 85014 HEMATOCRIT: CPT | Performed by: UROLOGY

## 2021-01-01 PROCEDURE — 85025 COMPLETE CBC W/AUTO DIFF WBC: CPT | Performed by: EMERGENCY MEDICINE

## 2021-01-01 PROCEDURE — 86900 BLOOD TYPING SEROLOGIC ABO: CPT | Performed by: EMERGENCY MEDICINE

## 2021-01-01 PROCEDURE — 86900 BLOOD TYPING SEROLOGIC ABO: CPT

## 2021-01-01 PROCEDURE — 94729 DIFFUSING CAPACITY: CPT

## 2021-01-01 PROCEDURE — 94060 EVALUATION OF WHEEZING: CPT

## 2021-01-01 PROCEDURE — 76775 US EXAM ABDO BACK WALL LIM: CPT

## 2021-01-01 PROCEDURE — 87086 URINE CULTURE/COLONY COUNT: CPT | Performed by: UROLOGY

## 2021-01-01 PROCEDURE — 94060 EVALUATION OF WHEEZING: CPT | Performed by: INTERNAL MEDICINE

## 2021-01-01 PROCEDURE — 93880 EXTRACRANIAL BILAT STUDY: CPT | Performed by: SURGERY

## 2021-01-01 PROCEDURE — 36415 COLL VENOUS BLD VENIPUNCTURE: CPT

## 2021-01-01 PROCEDURE — 63710000001 INSULIN LISPRO (HUMAN) PER 5 UNITS: Performed by: UROLOGY

## 2021-01-01 PROCEDURE — 83880 ASSAY OF NATRIURETIC PEPTIDE: CPT | Performed by: STUDENT IN AN ORGANIZED HEALTH CARE EDUCATION/TRAINING PROGRAM

## 2021-01-01 PROCEDURE — 25010000002 FENTANYL CITRATE (PF) 50 MCG/ML SOLUTION: Performed by: NURSE ANESTHETIST, CERTIFIED REGISTERED

## 2021-01-01 PROCEDURE — 99223 1ST HOSP IP/OBS HIGH 75: CPT | Performed by: STUDENT IN AN ORGANIZED HEALTH CARE EDUCATION/TRAINING PROGRAM

## 2021-01-01 PROCEDURE — 25010000002 METHYLPREDNISOLONE PER 125 MG: Performed by: STUDENT IN AN ORGANIZED HEALTH CARE EDUCATION/TRAINING PROGRAM

## 2021-01-01 PROCEDURE — 81003 URINALYSIS AUTO W/O SCOPE: CPT | Performed by: EMERGENCY MEDICINE

## 2021-01-01 PROCEDURE — 80053 COMPREHEN METABOLIC PANEL: CPT | Performed by: EMERGENCY MEDICINE

## 2021-01-01 PROCEDURE — 97161 PT EVAL LOW COMPLEX 20 MIN: CPT

## 2021-01-01 PROCEDURE — 83930 ASSAY OF BLOOD OSMOLALITY: CPT | Performed by: STUDENT IN AN ORGANIZED HEALTH CARE EDUCATION/TRAINING PROGRAM

## 2021-01-01 PROCEDURE — 25010000002 ONDANSETRON PER 1 MG: Performed by: NURSE ANESTHETIST, CERTIFIED REGISTERED

## 2021-01-01 PROCEDURE — 94726 PLETHYSMOGRAPHY LUNG VOLUMES: CPT

## 2021-01-01 PROCEDURE — 0TBB8ZZ EXCISION OF BLADDER, VIA NATURAL OR ARTIFICIAL OPENING ENDOSCOPIC: ICD-10-PCS | Performed by: UROLOGY

## 2021-01-01 PROCEDURE — 99231 SBSQ HOSP IP/OBS SF/LOW 25: CPT | Performed by: UROLOGY

## 2021-01-01 PROCEDURE — 63710000001 INSULIN LISPRO (HUMAN) PER 5 UNITS: Performed by: PHYSICIAN ASSISTANT

## 2021-01-01 PROCEDURE — 52001 CYSTO W/IRRG&EVAC MLT CLOTS: CPT | Performed by: UROLOGY

## 2021-01-01 PROCEDURE — 85025 COMPLETE CBC W/AUTO DIFF WBC: CPT | Performed by: UROLOGY

## 2021-01-01 PROCEDURE — 25010000002 CALCIUM GLUCONATE-NACL 1-0.675 GM/50ML-% SOLUTION: Performed by: STUDENT IN AN ORGANIZED HEALTH CARE EDUCATION/TRAINING PROGRAM

## 2021-01-01 PROCEDURE — 80053 COMPREHEN METABOLIC PANEL: CPT | Performed by: UROLOGY

## 2021-01-01 PROCEDURE — 63710000001 INSULIN DETEMIR PER 5 UNITS: Performed by: STUDENT IN AN ORGANIZED HEALTH CARE EDUCATION/TRAINING PROGRAM

## 2021-01-01 PROCEDURE — 25010000002 ONDANSETRON PER 1 MG: Performed by: UROLOGY

## 2021-01-01 PROCEDURE — 85610 PROTHROMBIN TIME: CPT | Performed by: EMERGENCY MEDICINE

## 2021-01-01 PROCEDURE — 94726 PLETHYSMOGRAPHY LUNG VOLUMES: CPT | Performed by: INTERNAL MEDICINE

## 2021-01-01 PROCEDURE — 80048 BASIC METABOLIC PNL TOTAL CA: CPT | Performed by: EMERGENCY MEDICINE

## 2021-01-01 PROCEDURE — 25010000002 PROPOFOL 10 MG/ML EMULSION: Performed by: NURSE ANESTHETIST, CERTIFIED REGISTERED

## 2021-01-01 PROCEDURE — 80048 BASIC METABOLIC PNL TOTAL CA: CPT

## 2021-01-01 PROCEDURE — 86850 RBC ANTIBODY SCREEN: CPT | Performed by: EMERGENCY MEDICINE

## 2021-01-01 PROCEDURE — 85018 HEMOGLOBIN: CPT | Performed by: UROLOGY

## 2021-01-01 PROCEDURE — 94729 DIFFUSING CAPACITY: CPT | Performed by: INTERNAL MEDICINE

## 2021-01-01 RX ORDER — INSULIN LISPRO 100 [IU]/ML
0-30 INJECTION, SUSPENSION SUBCUTANEOUS 2 TIMES DAILY
COMMUNITY

## 2021-01-01 RX ORDER — ASPIRIN 81 MG/1
81 TABLET ORAL EVERY MORNING
COMMUNITY
Start: 2021-01-01 | End: 2021-01-01 | Stop reason: HOSPADM

## 2021-01-01 RX ORDER — AMLODIPINE BESYLATE 5 MG/1
10 TABLET ORAL DAILY
Status: DISCONTINUED | OUTPATIENT
Start: 2021-01-01 | End: 2021-01-01 | Stop reason: HOSPADM

## 2021-01-01 RX ORDER — AMLODIPINE BESYLATE 10 MG/1
TABLET ORAL
Qty: 60 TABLET | Refills: 1 | Status: SHIPPED | OUTPATIENT
Start: 2021-01-01

## 2021-01-01 RX ORDER — PRAVASTATIN SODIUM 40 MG
40 TABLET ORAL NIGHTLY
COMMUNITY
Start: 2021-01-01

## 2021-01-01 RX ORDER — CALCIUM GLUCONATE 20 MG/ML
1 INJECTION, SOLUTION INTRAVENOUS ONCE
Status: COMPLETED | OUTPATIENT
Start: 2021-01-01 | End: 2021-01-01

## 2021-01-01 RX ORDER — POTASSIUM CHLORIDE 750 MG/1
10 TABLET, EXTENDED RELEASE ORAL EVERY EVENING
COMMUNITY
Start: 2021-01-01

## 2021-01-01 RX ORDER — ALBUTEROL SULFATE 2.5 MG/3ML
2.5 SOLUTION RESPIRATORY (INHALATION) ONCE
Status: COMPLETED | OUTPATIENT
Start: 2021-01-01 | End: 2021-01-01

## 2021-01-01 RX ORDER — SODIUM CHLORIDE 0.9 % (FLUSH) 0.9 %
10 SYRINGE (ML) INJECTION AS NEEDED
Status: DISCONTINUED | OUTPATIENT
Start: 2021-01-01 | End: 2021-01-01 | Stop reason: HOSPADM

## 2021-01-01 RX ORDER — POLYETHYLENE GLYCOL 3350 17 G/17G
17 POWDER, FOR SOLUTION ORAL DAILY PRN
Status: DISCONTINUED | OUTPATIENT
Start: 2021-01-01 | End: 2021-01-01 | Stop reason: HOSPADM

## 2021-01-01 RX ORDER — MORPHINE SULFATE 2 MG/ML
1 INJECTION, SOLUTION INTRAMUSCULAR; INTRAVENOUS
Status: DISCONTINUED | OUTPATIENT
Start: 2021-01-01 | End: 2021-01-01 | Stop reason: HOSPADM

## 2021-01-01 RX ORDER — OXYCODONE HYDROCHLORIDE AND ACETAMINOPHEN 5; 325 MG/1; MG/1
1 TABLET ORAL EVERY 6 HOURS PRN
Status: DISCONTINUED | OUTPATIENT
Start: 2021-01-01 | End: 2021-01-01 | Stop reason: HOSPADM

## 2021-01-01 RX ORDER — LANCING DEVICE
EACH MISCELLANEOUS SEE ADMIN INSTRUCTIONS
COMMUNITY
Start: 2021-01-01 | End: 2021-01-01

## 2021-01-01 RX ORDER — PROMETHAZINE HYDROCHLORIDE 12.5 MG/1
25 TABLET ORAL ONCE AS NEEDED
Status: DISCONTINUED | OUTPATIENT
Start: 2021-01-01 | End: 2021-01-01 | Stop reason: HOSPADM

## 2021-01-01 RX ORDER — OXYCODONE HYDROCHLORIDE 5 MG/1
5 TABLET ORAL
Status: COMPLETED | OUTPATIENT
Start: 2021-01-01 | End: 2021-01-01

## 2021-01-01 RX ORDER — AMLODIPINE BESYLATE 10 MG/1
TABLET ORAL
COMMUNITY
Start: 2021-01-01 | End: 2021-01-01

## 2021-01-01 RX ORDER — MAGNESIUM HYDROXIDE 1200 MG/15ML
LIQUID ORAL AS NEEDED
Status: DISCONTINUED | OUTPATIENT
Start: 2021-01-01 | End: 2021-01-01 | Stop reason: HOSPADM

## 2021-01-01 RX ORDER — ACETAMINOPHEN 325 MG/1
650 TABLET ORAL EVERY 4 HOURS PRN
Status: DISCONTINUED | OUTPATIENT
Start: 2021-01-01 | End: 2021-01-01 | Stop reason: HOSPADM

## 2021-01-01 RX ORDER — ROCURONIUM BROMIDE 10 MG/ML
INJECTION, SOLUTION INTRAVENOUS AS NEEDED
Status: DISCONTINUED | OUTPATIENT
Start: 2021-01-01 | End: 2021-01-01 | Stop reason: SURG

## 2021-01-01 RX ORDER — PRAVASTATIN SODIUM 40 MG
40 TABLET ORAL NIGHTLY
Status: DISCONTINUED | OUTPATIENT
Start: 2021-01-01 | End: 2021-01-01 | Stop reason: HOSPADM

## 2021-01-01 RX ORDER — FENTANYL CITRATE 50 UG/ML
INJECTION, SOLUTION INTRAMUSCULAR; INTRAVENOUS AS NEEDED
Status: DISCONTINUED | OUTPATIENT
Start: 2021-01-01 | End: 2021-01-01 | Stop reason: SURG

## 2021-01-01 RX ORDER — LIDOCAINE HYDROCHLORIDE 20 MG/ML
INJECTION, SOLUTION INFILTRATION; PERINEURAL AS NEEDED
Status: DISCONTINUED | OUTPATIENT
Start: 2021-01-01 | End: 2021-01-01 | Stop reason: SURG

## 2021-01-01 RX ORDER — METOPROLOL SUCCINATE 100 MG/1
TABLET, EXTENDED RELEASE ORAL
COMMUNITY
End: 2021-01-01

## 2021-01-01 RX ORDER — MEPERIDINE HYDROCHLORIDE 25 MG/ML
12.5 INJECTION INTRAMUSCULAR; INTRAVENOUS; SUBCUTANEOUS
Status: DISCONTINUED | OUTPATIENT
Start: 2021-01-01 | End: 2021-01-01 | Stop reason: HOSPADM

## 2021-01-01 RX ORDER — CLOPIDOGREL BISULFATE 75 MG/1
TABLET ORAL
COMMUNITY
End: 2021-01-01 | Stop reason: ALTCHOICE

## 2021-01-01 RX ORDER — NICOTINE POLACRILEX 4 MG
15 LOZENGE BUCCAL
Status: DISCONTINUED | OUTPATIENT
Start: 2021-01-01 | End: 2021-01-01 | Stop reason: HOSPADM

## 2021-01-01 RX ORDER — PROMETHAZINE HYDROCHLORIDE 25 MG/1
25 SUPPOSITORY RECTAL ONCE AS NEEDED
Status: DISCONTINUED | OUTPATIENT
Start: 2021-01-01 | End: 2021-01-01 | Stop reason: HOSPADM

## 2021-01-01 RX ORDER — PROPOFOL 10 MG/ML
VIAL (ML) INTRAVENOUS AS NEEDED
Status: DISCONTINUED | OUTPATIENT
Start: 2021-01-01 | End: 2021-01-01 | Stop reason: SURG

## 2021-01-01 RX ORDER — INSULIN LISPRO 100 [IU]/ML
INJECTION, SUSPENSION SUBCUTANEOUS
COMMUNITY
End: 2021-01-01

## 2021-01-01 RX ORDER — FUROSEMIDE 20 MG/1
60 TABLET ORAL EVERY MORNING
COMMUNITY

## 2021-01-01 RX ORDER — ACETAMINOPHEN 160 MG/5ML
650 SOLUTION ORAL EVERY 4 HOURS PRN
Status: DISCONTINUED | OUTPATIENT
Start: 2021-01-01 | End: 2021-01-01 | Stop reason: HOSPADM

## 2021-01-01 RX ORDER — ONDANSETRON 2 MG/ML
4 INJECTION INTRAMUSCULAR; INTRAVENOUS EVERY 6 HOURS PRN
Status: DISCONTINUED | OUTPATIENT
Start: 2021-01-01 | End: 2021-01-01 | Stop reason: HOSPADM

## 2021-01-01 RX ORDER — AMOXICILLIN 250 MG
2 CAPSULE ORAL 2 TIMES DAILY
Status: DISCONTINUED | OUTPATIENT
Start: 2021-01-01 | End: 2021-01-01 | Stop reason: HOSPADM

## 2021-01-01 RX ORDER — ACETAMINOPHEN 650 MG/1
650 SUPPOSITORY RECTAL EVERY 4 HOURS PRN
Status: DISCONTINUED | OUTPATIENT
Start: 2021-01-01 | End: 2021-01-01 | Stop reason: HOSPADM

## 2021-01-01 RX ORDER — BISACODYL 5 MG/1
5 TABLET, DELAYED RELEASE ORAL DAILY PRN
Status: DISCONTINUED | OUTPATIENT
Start: 2021-01-01 | End: 2021-01-01 | Stop reason: HOSPADM

## 2021-01-01 RX ORDER — BISACODYL 10 MG
10 SUPPOSITORY, RECTAL RECTAL DAILY PRN
Status: DISCONTINUED | OUTPATIENT
Start: 2021-01-01 | End: 2021-01-01 | Stop reason: HOSPADM

## 2021-01-01 RX ORDER — SODIUM CHLORIDE 0.9 % (FLUSH) 0.9 %
10 SYRINGE (ML) INJECTION EVERY 12 HOURS SCHEDULED
Status: DISCONTINUED | OUTPATIENT
Start: 2021-01-01 | End: 2021-01-01 | Stop reason: HOSPADM

## 2021-01-01 RX ORDER — LOSARTAN POTASSIUM 25 MG/1
25 TABLET ORAL EVERY EVENING
COMMUNITY
Start: 2021-01-01

## 2021-01-01 RX ORDER — NITROGLYCERIN 2 %
OINTMENT (GRAM) TRANSDERMAL EVERY 8 HOURS
COMMUNITY
Start: 2021-01-01 | End: 2021-01-01

## 2021-01-01 RX ORDER — ONDANSETRON 2 MG/ML
INJECTION INTRAMUSCULAR; INTRAVENOUS AS NEEDED
Status: DISCONTINUED | OUTPATIENT
Start: 2021-01-01 | End: 2021-01-01 | Stop reason: SURG

## 2021-01-01 RX ORDER — MONTELUKAST SODIUM 10 MG/1
10 TABLET ORAL EVERY EVENING
Status: DISCONTINUED | OUTPATIENT
Start: 2021-01-01 | End: 2021-01-01 | Stop reason: HOSPADM

## 2021-01-01 RX ORDER — CEFTRIAXONE SODIUM 1 G/50ML
1 INJECTION, SOLUTION INTRAVENOUS ONCE
Status: DISCONTINUED | OUTPATIENT
Start: 2021-01-01 | End: 2021-01-01

## 2021-01-01 RX ORDER — ONDANSETRON 2 MG/ML
4 INJECTION INTRAMUSCULAR; INTRAVENOUS ONCE AS NEEDED
Status: DISCONTINUED | OUTPATIENT
Start: 2021-01-01 | End: 2021-01-01 | Stop reason: HOSPADM

## 2021-01-01 RX ORDER — SITAGLIPTIN 50 MG/1
50 TABLET, FILM COATED ORAL DAILY PRN
COMMUNITY
Start: 2021-01-01 | End: 2021-01-01

## 2021-01-01 RX ORDER — SODIUM CHLORIDE, SODIUM LACTATE, POTASSIUM CHLORIDE, CALCIUM CHLORIDE 600; 310; 30; 20 MG/100ML; MG/100ML; MG/100ML; MG/100ML
INJECTION, SOLUTION INTRAVENOUS CONTINUOUS PRN
Status: DISCONTINUED | OUTPATIENT
Start: 2021-01-01 | End: 2021-01-01 | Stop reason: SURG

## 2021-01-01 RX ORDER — CEFTRIAXONE SODIUM 1 G/50ML
1 INJECTION, SOLUTION INTRAVENOUS EVERY 24 HOURS
Status: DISCONTINUED | OUTPATIENT
Start: 2021-01-01 | End: 2021-01-01 | Stop reason: HOSPADM

## 2021-01-01 RX ORDER — MONTELUKAST SODIUM 10 MG/1
10 TABLET ORAL EVERY EVENING
COMMUNITY
Start: 2021-01-01

## 2021-01-01 RX ORDER — FAMOTIDINE 10 MG/ML
40 INJECTION, SOLUTION INTRAVENOUS ONCE
Status: COMPLETED | OUTPATIENT
Start: 2021-01-01 | End: 2021-01-01

## 2021-01-01 RX ORDER — METHYLPREDNISOLONE SODIUM SUCCINATE 125 MG/2ML
125 INJECTION, POWDER, LYOPHILIZED, FOR SOLUTION INTRAMUSCULAR; INTRAVENOUS ONCE
Status: COMPLETED | OUTPATIENT
Start: 2021-01-01 | End: 2021-01-01

## 2021-01-01 RX ORDER — DEXTROSE MONOHYDRATE 100 MG/ML
25 INJECTION, SOLUTION INTRAVENOUS
Status: DISCONTINUED | OUTPATIENT
Start: 2021-01-01 | End: 2021-01-01 | Stop reason: HOSPADM

## 2021-01-01 RX ORDER — METOPROLOL SUCCINATE 25 MG/1
75 TABLET, EXTENDED RELEASE ORAL EVERY MORNING
COMMUNITY

## 2021-01-01 RX ADMIN — SODIUM CHLORIDE, POTASSIUM CHLORIDE, SODIUM LACTATE AND CALCIUM CHLORIDE: 600; 310; 30; 20 INJECTION, SOLUTION INTRAVENOUS at 14:12

## 2021-01-01 RX ADMIN — HYDROMORPHONE HYDROCHLORIDE 0.5 MG: 1 INJECTION, SOLUTION INTRAMUSCULAR; INTRAVENOUS; SUBCUTANEOUS at 16:39

## 2021-01-01 RX ADMIN — CEFTRIAXONE SODIUM 1 G: 1 INJECTION, SOLUTION INTRAVENOUS at 10:21

## 2021-01-01 RX ADMIN — INSULIN LISPRO 6 UNITS: 100 INJECTION, SOLUTION INTRAVENOUS; SUBCUTANEOUS at 20:54

## 2021-01-01 RX ADMIN — FAMOTIDINE 40 MG: 10 INJECTION INTRAVENOUS at 12:06

## 2021-01-01 RX ADMIN — FENTANYL CITRATE 50 MCG: 50 INJECTION, SOLUTION INTRAMUSCULAR; INTRAVENOUS at 14:41

## 2021-01-01 RX ADMIN — LIDOCAINE HYDROCHLORIDE 100 MG: 20 INJECTION, SOLUTION INFILTRATION; PERINEURAL at 14:19

## 2021-01-01 RX ADMIN — INDIGO CARMINE 5 ML: 8 INJECTION, SOLUTION INTRAMUSCULAR; INTRAVENOUS at 14:57

## 2021-01-01 RX ADMIN — SUGAMMADEX 200 MG: 100 INJECTION, SOLUTION INTRAVENOUS at 16:07

## 2021-01-01 RX ADMIN — CALCIUM GLUCONATE 1 G: 20 INJECTION, SOLUTION INTRAVENOUS at 08:35

## 2021-01-01 RX ADMIN — FENTANYL CITRATE 25 MCG: 50 INJECTION, SOLUTION INTRAMUSCULAR; INTRAVENOUS at 14:19

## 2021-01-01 RX ADMIN — ONDANSETRON 4 MG: 2 INJECTION INTRAMUSCULAR; INTRAVENOUS at 14:34

## 2021-01-01 RX ADMIN — FENTANYL CITRATE 25 MCG: 50 INJECTION, SOLUTION INTRAMUSCULAR; INTRAVENOUS at 14:33

## 2021-01-01 RX ADMIN — ROCURONIUM BROMIDE 10 MG: 10 INJECTION INTRAVENOUS at 15:33

## 2021-01-01 RX ADMIN — PROPOFOL 130 MG: 10 INJECTION, EMULSION INTRAVENOUS at 14:19

## 2021-01-01 RX ADMIN — ROCURONIUM BROMIDE 50 MG: 10 INJECTION INTRAVENOUS at 14:19

## 2021-01-01 RX ADMIN — PRAVASTATIN SODIUM 40 MG: 40 TABLET ORAL at 20:54

## 2021-01-01 RX ADMIN — INSULIN LISPRO 4 UNITS: 100 INJECTION, SOLUTION INTRAVENOUS; SUBCUTANEOUS at 17:38

## 2021-01-01 RX ADMIN — INSULIN DETEMIR 5 UNITS: 100 INJECTION, SOLUTION SUBCUTANEOUS at 08:22

## 2021-01-01 RX ADMIN — AMLODIPINE BESYLATE 10 MG: 5 TABLET ORAL at 17:38

## 2021-01-01 RX ADMIN — METHYLPREDNISOLONE SODIUM SUCCINATE 125 MG: 125 INJECTION, POWDER, FOR SOLUTION INTRAMUSCULAR; INTRAVENOUS at 12:06

## 2021-01-01 RX ADMIN — OXYCODONE HYDROCHLORIDE AND ACETAMINOPHEN 1 TABLET: 5; 325 TABLET ORAL at 20:54

## 2021-01-01 RX ADMIN — SODIUM CHLORIDE 500 ML: 9 INJECTION, SOLUTION INTRAVENOUS at 09:04

## 2021-01-01 RX ADMIN — DOCUSATE SODIUM 50MG AND SENNOSIDES 8.6MG 2 TABLET: 8.6; 5 TABLET, FILM COATED ORAL at 08:21

## 2021-01-01 RX ADMIN — INSULIN LISPRO 6 UNITS: 100 INJECTION, SOLUTION INTRAVENOUS; SUBCUTANEOUS at 08:21

## 2021-01-01 RX ADMIN — SODIUM CHLORIDE, PRESERVATIVE FREE 10 ML: 5 INJECTION INTRAVENOUS at 12:07

## 2021-01-01 RX ADMIN — ALBUTEROL SULFATE 2.5 MG: 2.5 SOLUTION RESPIRATORY (INHALATION) at 09:20

## 2021-01-01 RX ADMIN — METOPROLOL SUCCINATE 75 MG: 50 TABLET, EXTENDED RELEASE ORAL at 06:28

## 2021-01-01 RX ADMIN — OXYCODONE HYDROCHLORIDE 5 MG: 5 TABLET ORAL at 16:46

## 2021-01-01 RX ADMIN — AMLODIPINE BESYLATE 10 MG: 5 TABLET ORAL at 08:21

## 2021-01-01 RX ADMIN — SODIUM CHLORIDE, PRESERVATIVE FREE 10 ML: 5 INJECTION INTRAVENOUS at 08:35

## 2021-01-01 RX ADMIN — OXYCODONE HYDROCHLORIDE 5 MG: 5 TABLET ORAL at 16:28

## 2021-01-01 RX ADMIN — SODIUM BICARBONATE 100 ML/HR: 84 INJECTION, SOLUTION INTRAVENOUS at 09:26

## 2021-01-01 RX ADMIN — SODIUM CHLORIDE, PRESERVATIVE FREE 10 ML: 5 INJECTION INTRAVENOUS at 20:55

## 2021-01-01 RX ADMIN — ONDANSETRON 4 MG: 2 INJECTION INTRAMUSCULAR; INTRAVENOUS at 07:47

## 2021-05-10 NOTE — H&P
History and Physical      Patient Name: [place]   Patient ID: [id number]   Sex: Male   YOB: 1940    Primary Care Provider: Agnes MARR   Referring Provider: Agnes MARR    Visit Date: March 31, 2020    Provider: Antonino Dunn MD   [facility]   Location Address: [address] [place] [address]  [id number]   Location Phone: [phone]          Chief Complaint  · pt here for urologic issues      History Of Present Illness     79-year-old  gentleman with a history of nephrolithiasis with recent gross hematuria couple months back    Patient was passing gross hematuria with clots back in February.  He was asymptomatic at that time.  This only lasted a few days he has no blood in his urine currently.  He was having no burning/dysuria or discomfort.  That was just asymptomatic gross hematuria.  Nothing made the blood better or worse.    PVR today 23    It was diagnosed by the ER at [facility] with UTI.  He has never had 1 of these before.    2/29/2020 UA3+ blood, nitrite negative, leukocyte negative, trace bacteria    UA was after he had finished 14 days of antibiotics for a nurse practitioner who diagnosed him with a UTI.    ok stream.  No trouble with initiation of stream. Nocturia X  4.  No incontinence.  No prostate meds.  Is on a diuretic.  Does have some frequency with urgency.    Patient has had greater than in kidney stones, he has never had lithotripsy    No  family history.    2012 CABG.  Asthma.  Former smoke.  ASA 79      Dr. Sommer did do cystoscopy about 15 years ago.                 Past Medical History  Allergic rhinitis, chronic; Arthritis; Asthma; Bladder Disorder; Congestive heart failure; Diabetes; Heart Disease; High blood pressure; High cholesterol; History of stroke; Kidney stones; Limb Pain; Limb Swelling; Stroke         Past Surgical History  Open Heart Surgery; Tonsillectomy         Medication List  amlodipine 10 mg  oral tablet; aspirin 81 mg oral tablet,delayed release (DR/EC); furosemide 20 mg oral tablet; Humalog Mix 50-50 Insuln U-100 100 unit/mL (50-50) subcutaneous suspension; losartan 25 mg oral tablet; metoprolol succinate 100 mg oral tablet extended release 24 hr; montelukast 10 mg oral tablet; pravastatin 40 mg oral tablet         Allergy List  atenolol; carvedilol; clonidine; doxycycline hyclate; hydralazine; prednisone; Risperdal       Allergies Reconciled  Family Medical History  -Father's Family History Unknown; -Mother's Family History Unknown         Social History  Tobacco (Former)         Review of Systems  · Constitutional  o Denies  o : chills, fever  · Gastrointestinal  o Denies  o : nausea, vomiting     10 systems reviewed and are negative other than what is listed in HPI       Vitals  Date Time BP Position Site L\R Cuff Size HR RR TEMP (F) WT  HT  BMI kg/m2 BSA m2 O2 Sat        03/31/2020 08:21 AM       18  235lbs 8oz 6'   31.94 2.33           Physical Examination  · Constitutional  o Appearance  o : Well-appearing, well-developed, in no acute distress  · Respiratory  o Respiratory Effort  o : Unlabored breathing  o Auscultation of Lungs  o : Unlabored breathing, clear to auscultation bilaterally  · Cardiovascular  o Heart  o :   § Auscultation of Heart  § : Regular rate and rhythm, no murmurs  · Gastrointestinal  o Abdominal Examination  o : Nontender, nondistended, no rigidity or guarding, no hepatosplenomegaly  · Neurologic  o Mental Status Examination  o :   § Orientation  § : Grossly oriented to person, place and time, judgment and insight intact, normal mood and affect          Results  · In-Office Procedures  o Surgical procedure  § IOP - Bladder Scan/Residual Urine (47080)   § Specimen vol Ur: 23   o Lab procedure  § Automated Dipstick Urinalysis (Surg Spec) WITHOUT Micro [facility] (25190)   § Color Ur: Yellow   § Clarity Ur: Clear   § Glucose Ur Ql Strip: 100 mg/dL   § Bilirub Ur Ql Strip: Negative    § Ketones Ur Ql Strip: Negative   § Sp Gr Ur Qn: 1.015   § Hgb Ur Ql Strip: Large   § pH Ur-LsCnc: 7.0   § Prot Ur Ql Strip: Negative   § Urobilinogen Ur Strip-mCnc: 0.2 E.U./dL   § Nitrite Ur Ql Strip: Negative   § WBC Est Ur Ql Strip: Negative       Assessment  · Gross hematuria     599.71/R31.0  · History of nephrolithiasis     V13.01/Z87.442    Problems Reconciled  Plan  · Orders  o Urine cytology (73685) - 599.71/R31.0 - 03/31/2020  · Medications  o Medications have been Reconciled  o Transition of Care or Provider Policy  · Instructions  o Electronically Identified Patient Education Materials Provided Electronically     Records reviewed today and summarized in the chart    Patient with asymptomatic gross hematuria with clots.  Still some blood in the urine today.  We discussed risk and benefits of moving forward with hematuria work-up in the role of coronavirus pandemic.  I did discuss with the patient he does have about a 5 -8% risk of having a kidney or bladder cancer.  I did discuss with him that I would like to order a CT and get him set up for his office cystoscopy.   After discussion of risk and benefits,  patient at this time wants to wait.  He is afraid of getting coronavirus and I do think this is reasonable, but I did tell him I cannot tell him he does not have a  malignancy.  Patient voiced understanding and accepts the risk.    Urine cytology today    Follow-up in 3 months to discuss again.    We will acquire records from his primary care physician    Greater than 30 minutes was used in counseling and coordination of care, with greater than 51% of this in face-to-face counseling             Electronically Signed by: Antonino Dunn MD -Author on March 31, 2020 11:21:36 AM

## 2021-05-10 NOTE — H&P
History and Physical      Patient Name: [place] Juarez   Patient ID: [id number]   Sex: Male   YOB: 1940    Primary Care Provider: Agnes MARR   Referring Provider: Agnes MARR    Visit Date: October 5, 2020    Provider: Antonino Dunn MD   Location: [facility]   Location Address: [address] [place] [address]  [id number]   Location Phone: [phone]          Chief Complaint  · Outpatient History & Physical / Surgical Orders      History Of Present Illness  [facility]  Outpatient History and Physical Surgical Orders  Preadmission Location: Phone Preadmission Time: 02:00 PM   Which Facility: [facility] Surgery Date: 10/22/2020 Preadmission Testing Date: 10/16/2020   Patient's Name: [place] Juarez YOB: 1940   Chief complaint/history present illness: Bladder tumor   Current Medication List: amlodipine 10 mg oral tablet, Aspir-81 81 mg oral tablet,delayed release (DR/EC), Brilinta 90 mg oral tablet, Effient 10 mg oral tablet, furosemide 20 mg oral tablet, Humalog Mix 50-50 Insuln U-100 100 unit/mL (50-50) subcutaneous suspension, losartan 25 mg oral tablet, metoprolol succinate 100 mg oral tablet extended release 24 hr, nitroglycerin 0.4 mg sublingual tablet, sublingual, Plavix 75 mg oral tablet, and pravastatin 40 mg oral tablet   Allergies: atenolol, carvedilol, clonidine, doxycycline hyclate, hydralazine, prednisone, and Risperdal   Significant past medical: Allergic rhinitis, chronic, Arthritis, Asthma, Bladder Disorder, Congestive heart failure, Diabetes, Heart Disease, High blood pressure, High cholesterol, History of stroke, Kidney stones, Limb Pain, Limb Swelling, and Stroke   Past Surgical History: cardiac stents, Open Heart Surgery, and Tonsillectomy   Examination of heart and lungs: Regular rate, rhythm, no murmur, gallop, rub, Breath sounds normal, no distress, and Abdomen soft, non-tender, BSx4 are  positive         Past Medical History  Allergic rhinitis, chronic; Arthritis; Asthma; Bladder Disorder; Congestive heart failure; Diabetes; Heart Disease; High blood pressure; High cholesterol; History of stroke; Kidney stones; Limb Pain; Limb Swelling; Stroke         Past Surgical History  cardiac stents; Open Heart Surgery; Tonsillectomy         Medication List  amlodipine 10 mg oral tablet; Aspir-81 81 mg oral tablet,delayed release (DR/EC); Brilinta 90 mg oral tablet; Effient 10 mg oral tablet; furosemide 20 mg oral tablet; Humalog Mix 50-50 Insuln U-100 100 unit/mL (50-50) subcutaneous suspension; losartan 25 mg oral tablet; metoprolol succinate 100 mg oral tablet extended release 24 hr; nitroglycerin 0.4 mg sublingual tablet, sublingual; Plavix 75 mg oral tablet; pravastatin 40 mg oral tablet         Allergy List  atenolol; carvedilol; clonidine; doxycycline hyclate; hydralazine; prednisone; Risperdal         Family Medical History  -Father's Family History Unknown; -Mother's Family History Unknown         Social History  Tobacco (Former)             Assessment  · Pre-Surgical Orders     V72.84  · Preop testing     V72.84/Z01.818      Plan  · Orders  o General Urology Surgery Order ([facility]) - V72.84 - 10/22/2020  o [facility] Pre-Op Covid-19 Screening (83836) - V72.84 - 10/17/2020   1215 at [facility]  · Medications  o Medications have been Reconciled  o Transition of Care or Provider Policy  · Instructions  o *****Surgical Orders******  o Pre-Operative Orders: Sign permit for Transurethral resection of bladder tumor  o Outpatient   o Levaquin 500 mg IV OCTOR.  o RISK AND BENEFITS:  o Possible risks/complications, benefits and alternatives to surgical or invasive procedure have been explained to patient and/or legal guardian.  o Electronically Identified Patient Education Materials Provided Electronically            Electronically Signed by: Antonino Dunn MD -Author on October 5, 2020 04:22:09 PM

## 2021-05-10 NOTE — PROCEDURES
Procedure Note      Patient Name: [place] Juarez   Patient ID: [id number]   Sex: Male   YOB: 1940    Primary Care Provider: Agnes MARR   Referring Provider: Agnes MARR    Visit Date: October 5, 2020    Provider: Antonino Dunn MD   Location: [facility]   Location Address: [address] [place] [address]  [id number]   Location Phone: [phone]          Cystoscopy Procedure:  The patients urine was viewe d under a microscope during his clinical visit: no RBC present, no WBC present, no Bacteria present.          PROCEDURE: Flexible cystoscope was passed per urethra into the bladder without difficulty after proper consent.     3 cm prostate.      3 cm x 2 cm area of friable material on the left trigone.  Hard to identify the left ureter.    Some abnormal raised erythematous tissue on the left lateral wall.  Right lateral wall and dome looks okay but there was a lot of particulate matter floating around.     Of note, there was no increased vascularity as well. Both ureteral orifices were identified and were normal in appearance. The flexible cystoscope was removed. The patient tolerated the procedure well.           Assessment  · Bladder cancer     188.9/C67.9  · Gross hematuria     599.71/R31.0      Plan  · Orders  o Cystoscopy (95756) - 188.9/C67.9 - 10/05/2020  o Urine Culture (Clean Catch) [facility] (78013) - 599.71/R31.0 - 10/05/2020  · Medications  o Medications have been Reconciled  o Transition of Care or Provider Policy  · Instructions  o We will follow up in one year or sooner if needed.  o Electronically Identified Patient Education Materials Provided Electronically       Patient still with an area that would be amenable to TURBT.  We will plan on taking him for resection.  Risks and benefits were discussed including bleeding, infection and damage to the urinary system.  We also discussed the risk of anesthesia up to and including death.  Patient  voiced understanding and would like to proceed.     Patient understands this is not curative but to hopefully delay onset of problematic symptoms from his T2 bladder cancer.  Did discuss there is always risk make things worse like a postoperative complication or bleeding.  Patient voiced understanding would like to proceed.    Patient has refused all of the treatment options in the past.             Electronically Signed by: Antonino Dunn MD -Author on October 5, 2020 03:12:33 PM

## 2021-05-11 NOTE — PROCEDURES
Procedure Note      Patient Name: [place] Juarez   Patient ID: [id number]   Sex: Male   YOB: 1940    Primary Care Provider: Agnes MARR   Referring Provider: Agnes MARR    Visit Date: April 26, 2021    Provider: Antonino Dunn MD   Location: [facility]   Location Address: [address] [place] [address]  [id number]   Location Phone: [phone]          Cystoscopy Procedure:  The patients urine was viewe d under a microscope during his clinical visit: no RBC present, no WBC present, no Bacteria present.          PROCEDURE: Flexible cystoscope was passed per urethra into the bladder without difficulty after proper consent.     3 cm prostate.      Patient with bulky erythematous raised masslike tissue on the left trigone and left bladder wall. Unable to identify the left ureteral orifice.    Right side of the bladder with a little bit abnormal tissue looks normal otherwise. Some moderate trabeculations throughout. Some erythema posteriorly. Posterior dome and right-sided bladder looked fairly normal    The flexible cystoscope was removed. The patient tolerated the procedure well.           Assessment  · Bladder cancer     188.9/C67.9  · Gross hematuria     599.71/R31.0  · Hydronephrosis     591/N13.30      Plan  · Orders  o Cystoscopy (00069) - 188.9/C67.9 - 04/26/2021  o BMP Non-fasting [facility] (08251) - 599.71/R31.0, 591/N13.30 - 10/26/2021  · Instructions  o We will follow up in one year or sooner if needed.       1/21 creatinine 1.1, GFR greater than 60  12/20 creatinine 1.0, GFR greater than 60    4/21 CT abdomen/pelvis with - interval progression of nodular bladder wall thickening. New irregular nodule to superior to the bladder towards the left that obstructs the distal left ureter. 3.3 cm mass in the pelvis causing this hydronephrosis    Patient did receive immunotherapy through medical oncology, at this time he does not have any follow-up with them.  They feel like it was not helping.    CT images and read reviewed and discussed with the patient he has new onset hydronephrosis on the left from a new pelvic mass. I did discuss his kidney function is still normal but he will slowly lose function of the left kidney over the next several months to year. We did discuss risk and benefits of placing nephrostomy tube. Patient at this time is not interested. He understands risk of losing kidney function. We did discuss that at this point medical oncology has backed off and I will also back off and only help if he needs it. Patient has declined any further treatment for his bladder cancer and he understands the risk of this.    Follow-up in 6 months with BMP. Sooner if needed             Electronically Signed by: Antonino Dunn MD -Author on April 26, 2021 02:50:34 PM

## 2021-05-12 NOTE — PROGRESS NOTES
Quick Note      Patient Name: [place] Juarez   Patient ID: [id number]   Sex: Male   YOB: 1940    Primary Care Provider: Agnes MARR   Referring Provider: Agnes MARR    Visit Date: April 24, 2020    Provider: Antonino Dunn MD   Location: [facility]   Location Address: [address] [place] [address]  [id number]   Location Phone: [phone]          History Of Present Illness  TELEHEALTH TELEPHONE VISIT  Chief Complaint: High-grade bladder cancer   [place] Juarez is a 79 year old /White male who is presenting for evaluation via telehealth telephone visit. Verbal consent obtained before beginning visit.   Provider spent 15 minutes with the patient during telehealth visit.   The following staff were present during this visit: Susu Mirza   Past Medical History/Overview of Patient Symptoms       HPI    79-year-old  gentleman with T2 high-grade urothelial carcinoma    Patient follows up today after having had evaluation with both radiation oncology and medical oncology    4/6/2020 TURBT with clot evacuation    Patient has declined cystectomy after risk and benefits were discussed.  He is also declined radiation or chemotherapy.  This is been discussed with him in detail multiple times and he understands the alternative is death from bladder cancer.    Patient did get a recent UTI with burning and dysuria.  No fevers.  He was treated with Cipro as PCP and is still on this.  He is getting better.  He has had a few episodes of gross hematuria but at this time clear           Assessment  · Bladder cancer     188.9/C67.9      Plan  · Orders  o Physician Telephone Evaluation, 11-20 minutes (38250) - 188.9/C67.9 - 04/24/2020  · Medications  o Medications have been Reconciled  o Transition of Care or Provider Policy  · Instructions  o Plan Of Care:   o Electronically Identified Patient Education Materials Provided Electronically       I once again went  over options with the patient including cystectomy, radiation and chemotherapy.  Patient understands the risk of death from bladder cancer and also understands the risk of each of these options.  He does not want to do any of these.    Patient states he would rather die of bladder cancer than have these treatments.  He understands the risk    I have discussed going back for another TURBT to re-resect more tumor and hopefully put off him bleeding from this cancer.  Discussed risk and benefits of this and he is interested in doing this.  At this time I will see him back in 2 months to discuss risk and benefits and possibility of doing a resection TURBT for symptomatic control.                 Electronically Signed by: Antonino Dunn MD -Author on April 24, 2020 03:22:45 PM

## 2021-05-12 NOTE — PROGRESS NOTES
"   Progress Note      Patient Name: [place] Juarez   Patient ID: [id number]   Sex: Male   YOB: 1940    Primary Care Provider: Agnes MARR   Referring Provider: Agnes MARR    Visit Date: April 13, 2020    Provider: Antonino Dunn MD   Location: [facility]   Location Address: [address] [place] [address]  [id number]   Location Phone: [phone]          Chief Complaint  · Pt is here for urological concerns      History Of Present Illness     58      PVR     3/20  23     No prostate meds.  Is on a diuretic.    Patient has had greater than in kidney stones, he has never had lithotripsy    No  family history.    2012 CABG.  Asthma.  Former smoke.  Anticoagulation, positive for diabetes, history ofCVA            \">79-year-old  gentleman with a history of nephrolithiasis today status post recent emergent TURBT secondary to clot retention and a bladder tumor    Patient had a catheter for about a week.  It is been clear the last few days.  Minimal pain.  No fevers or chills.    4/6/2020 TURBT/clot evacuationone third of his bladder is full of clots, large sessile bladder tumor left lateral wall.  I resected everything that looked abnormal but there is still some erythema surrounding this area.  path -TCC, invasive, high-grade, muscularis propria present and involved, T2    4/5/2020 CT abdomen/pelvis withmass involving the urinary bladder.  No definite enlarged pelvic lymphadenopathy.  No hydronephrosis    4/5/2020 creatinine 0.98, GFR > 60      PVR     3/20  23     No prostate meds.  Is on a diuretic.    Patient has had greater than in kidney stones, he has never had lithotripsy    No  family history.    2012 CABG.  Asthma.  Former smoke.  Anticoagulation, positive for diabetes, history ofCVA                   Past Medical History  Allergic rhinitis, chronic; Arthritis; Asthma; Bladder Disorder; Congestive heart failure; Diabetes; Heart Disease; High blood " pressure; High cholesterol; History of stroke; Kidney stones; Limb Pain; Limb Swelling; Stroke         Past Surgical History  Open Heart Surgery; Tonsillectomy         Medication List  amlodipine 10 mg oral tablet; aspirin 81 mg oral tablet,delayed release (DR/EC); furosemide 20 mg oral tablet; Humalog Mix 50-50 Insuln U-100 100 unit/mL (50-50) subcutaneous suspension; losartan 25 mg oral tablet; metoprolol succinate 100 mg oral tablet extended release 24 hr; montelukast 10 mg oral tablet; pravastatin 40 mg oral tablet         Allergy List  atenolol; carvedilol; clonidine; doxycycline hyclate; hydralazine; prednisone; Risperdal         Family Medical History  -Father's Family History Unknown; -Mother's Family History Unknown         Social History  Tobacco (Former)         Review of Systems  · Constitutional  o Denies  o : chills, fever  · Gastrointestinal  o Denies  o : nausea, vomiting, diarrhea      Vitals  Date Time BP Position Site L\R Cuff Size HR RR TEMP (F) WT  HT  BMI kg/m2 BSA m2 O2 Sat        04/13/2020 03:06 PM       16  235lbs 0oz 6'   31.87 2.33           Physical Examination  · Constitutional  o Appearance  o : Well-appearing, well-developed, in no acute distress  · Respiratory  o Respiratory Effort  o : Unlabored breathing  · Neurologic  o Mental Status Examination  o :   § Orientation  § : Grossly oriented to person, place and time, judgment and insight intact, normal mood and affect              Assessment  · Gross hematuria     599.71/R31.0  · History of nephrolithiasis     V13.01/Z87.442  · Malignant neoplasm of lateral wall of urinary bladder     188.2/C67.2    Problems Reconciled  Plan  · Medications  o Medications have been Reconciled  o Transition of Care or Provider Policy  · Instructions  o Electronically Identified Patient Education Materials Provided Electronically       Discussed pathology with the patient today    Discussed with patient he does have a bladder cancer that if left  untreated would cause death within the next 6 months to 2 years.  We also discussed it would have a lot of bleeding and painful complications.    Discussed options including neoadjuvant chemotherapy with cystectomy, cystectomy, chemotherapy and XRT combination with repeat TURBT .  We briefly discussed immunotherapy.  Risk and benefits of each were discussed.  We did discuss with the patient because of his age and multiple medical comorbidities he is high risk for cystectomy but could be considered as he does not have any gross signs of bladder cancer outside the bladder at this time    We did discuss there is some possibility he could be cured with a cystectomy but I do think it is mortality rate would be 10 to 15%     Patient is adamant he does not want a cystectomy.  He said he is feels good at this time and can still enjoy his life would rather have a short period of feeling good then undergo surgery and possible have major complication or death    We also discussed referring him to medical oncology and radiation oncology.  Patient is adamant he does not want chemotherapy or radiation.  We discussed this in detail.  I am going to send him to medical oncology to see if they can talk about risk and benefits of chemotherapy and immunotherapy.    Today he does not want me to make an appointment to see radiation oncology.    I will talk to him again after he is seen medical oncology, we did remove his catheter today.    Patient will stay off his aspirin and drink plenty of fluids.  I did discuss if he gets where he cannot void again we would have to repeat clot evacuation.    At his next appointment I will have to discuss with him whether he wants a repeat TURP in the near future if we are not going to do any other treatments.    Greater than 1 hour was used in counseling and coordination of care, with greater than 51% of this in face-to-face counseling             Electronically Signed by: Antonino Dunn MD  -Author on April 13, 2020 04:00:34 PM

## 2021-05-13 NOTE — PROGRESS NOTES
"   Progress Note      Patient Name: Casey Juarez   Patient ID: [id number]   Sex: Male   YOB: 1940    Primary Care Provider: Agnes MARR   Referring Provider: Agnes MARR    Visit Date: November 6, 2020    Provider: Antonino Dunn MD   Location: [facility]   Location Address: [address] [place] [address]  [id number]   Location Phone: [phone]          Chief Complaint  · pt here for urologic issues      History Of Present Illness  TELEHEALTH TELEPHONE VISIT  Casey Juarez is a 79 year old /White male who is presenting for evaluation via telehealth telephone visit. Verbal consent obtained before beginning visit.   Provider spent 11 minutes with the patient during the telehealth visit.   The following staff were present during this visit: Miriam Cooper   Past Medical History/ Overview of Patient Symptoms     60    PVR     3/20  23     No prostate meds.  on a diuretic.    Patient has had greater than in kidney stones, he has never had lithotripsy    No  family history.            \">79-year-old  gentleman with a history of nephrolithiasis also with T2 TCC.      Follows up after recent surgery    10/22/2020 TURBT - resected all tumor I could safely.  Bladder was very large and I couls not reach the top part of the tumor.  Did resect over the left ureteral orifice but we had good efflux of indigo carmine.  PathologyT2 high-grade TCC    No bleeding.  Having Some urge incontinence    Voiding without issue.      Off  Anticoagulation    Previous    2012 CABG.  Cornary stent placed in 6/20.  Asthma.  Former smoke, positive for diabetes, history of CVA    6/20 creatinine 1.0, GFR 63    9/20 urethral dilation before catheter to be placed    4/6/2020 TURBT/clot evacuationone third of his bladder is full of clots, large sessile bladder tumor left lateral wall.  I resected everything that looked abnormal but there is still some erythema " surrounding this area.  path -TCC, invasive, high-grade, muscularis propria present and involved, T2    4/5/2020 CT abdomen/pelvis withmass involving the urinary bladder.  No definite enlarged pelvic lymphadenopathy.  No hydronephrosis    4/5/2020 creatinine 0.98, GFR > 60    PVR     3/20  23     No prostate meds.  on a diuretic.    Patient has had greater than in kidney stones, he has never had lithotripsy    No  family history.                   Past Medical History  Allergic rhinitis, chronic; Arthritis; Asthma; Bladder Cancer; Bladder Disorder; Congestive heart failure; Diabetes; Gross hematuria; Heart Disease; High blood pressure; High cholesterol; History of stroke; Kidney stones; Limb Pain; Limb Swelling; Stroke         Past Surgical History  cardiac stents; Open Heart Surgery; Tonsillectomy; TURBT         Medication List  amlodipine 10 mg oral tablet; Aspir-81 81 mg oral tablet,delayed release (DR/EC); Effient 10 mg oral tablet; furosemide 20 mg oral tablet; Humalog Mix 50-50 Insuln U-100 100 unit/mL (50-50) subcutaneous suspension; losartan 25 mg oral tablet; metoprolol succinate 100 mg oral tablet extended release 24 hr; nitroglycerin 0.4 mg sublingual tablet, sublingual; pravastatin 40 mg oral tablet         Allergy List  atenolol; carvedilol; clonidine; doxycycline hyclate; hydralazine; prednisone; Risperdal       Allergies Reconciled  Family Medical History  -Father's Family History Unknown; -Mother's Family History Unknown         Social History  Tobacco (Former)         Review of Systems  · Constitutional  o Denies  o : chills  · Respiratory  o Denies  o : cough  · Gastrointestinal  o Denies  o : nausea              Assessment  · Gross hematuria     599.71/R31.0  · History of nephrolithiasis     V13.01/Z87.442  · Malignant neoplasm of lateral wall of urinary bladder     188.2/C67.2      Plan  · Orders  o Physician Telephone Evaluation, 11-20 minutes (70108) - 599.71/R31.0, V13.01/Z87.442,  188.2/C67.2 - 11/06/2020  · Medications  o Medications have been Reconciled  o Transition of Care or Provider Policy  · Instructions  o Plan Of Care:   o Electronically Identified Patient Education Materials Provided Electronically       Doing well after surgery, I did discuss with the patient that his surgery was hard and going back in again would be high risk as I was worried about bleeding after surgery.  I also resected over his left ureteral orifice.    Follow-up in 3 months with a renal ultrasound to see how he is doing    Patient has declined all forms of muscle invasive bladder cancer treatment in the past but today is discussing some monoclonal antibodies he had seen on the Internet.  I did discuss with him I would like to send him back to medical oncology to discuss options again.  He is completely against chemo/radiation but could be interested in immunotherapy or monoclonal antibody if this is available.  I discussed him I am not sure because I am not a medical oncologist but I will have him see medical oncology again.  He would like to see a different medical oncologist and we will have him see Dr. Tay.             Electronically Signed by: Antonino Dunn MD -Author on November 6, 2020 11:12:47 AM

## 2021-05-13 NOTE — PROGRESS NOTES
"   Progress Note      Patient Name: Casey Juarez   Patient ID: [id number]   Sex: Male   YOB: 1940    Primary Care Provider: Agnes MARR   Referring Provider: Agnes MARR    Visit Date: September 8, 2020    Provider: Antonino Dunn MD   Location: [facility]   Location Address: [address] [place] [address]  [id number]   Location Phone: [phone]          Chief Complaint  · pt here for urologic issues      History Of Present Illness     60    PVR     3/20  23     No prostate meds.  on a diuretic.    Patient has had greater than in kidney stones, he has never had lithotripsy    No  family history.            \">79-year-old  gentleman with a history of nephrolithiasis also with T2 TCC.      Had 3 episodes of gross hematuria since his last visit.  He was seen by on-call urologist and had catheter placed on CBI for 3 days.    pt has explained the last few weeks.  He has taken nitroglycerin a few times.  Also has some blood pressure.    Voiding without issue.  No gross hematuria currently.    Patient currently on Brilinta.      2012 CABG.  Cornary stent placed in 6/20.  Asthma.  Former smoke, positive for diabetes, history of CVA    6/20 creatinine 1.0, GFR 63    9/20 urethral dilation before catheter to be placed    Previous    4/6/2020 TURBT/clot evacuationone third of his bladder is full of clots, large sessile bladder tumor left lateral wall.  I resected everything that looked abnormal but there is still some erythema surrounding this area.  path -TCC, invasive, high-grade, muscularis propria present and involved, T2    4/5/2020 CT abdomen/pelvis withmass involving the urinary bladder.  No definite enlarged pelvic lymphadenopathy.  No hydronephrosis    4/5/2020 creatinine 0.98, GFR > 60    PVR     3/20  23     No prostate meds.  on a diuretic.    Patient has had greater than in kidney stones, he has never had lithotripsy    No  family " history.                   Past Medical History  Allergic rhinitis, chronic; Arthritis; Asthma; Bladder Disorder; Congestive heart failure; Diabetes; Heart Disease; High blood pressure; High cholesterol; History of stroke; Kidney stones; Limb Pain; Limb Swelling; Stroke         Past Surgical History  cardiac stents; Open Heart Surgery; Tonsillectomy         Medication List  amlodipine 10 mg oral tablet; Aspir-81 81 mg oral tablet,delayed release (DR/EC); Brilinta 90 mg oral tablet; Effient 10 mg oral tablet; furosemide 20 mg oral tablet; Humalog Mix 50-50 Insuln U-100 100 unit/mL (50-50) subcutaneous suspension; losartan 25 mg oral tablet; metoprolol succinate 100 mg oral tablet extended release 24 hr; nitroglycerin 0.4 mg sublingual tablet, sublingual; Plavix 75 mg oral tablet; pravastatin 40 mg oral tablet         Allergy List  atenolol; carvedilol; clonidine; doxycycline hyclate; hydralazine; prednisone; Risperdal         Family Medical History  -Father's Family History Unknown; -Mother's Family History Unknown         Social History  Tobacco (Former)         Review of Systems  · Constitutional  o Denies  o : chills, fever  · Gastrointestinal  o Denies  o : nausea, vomiting      Physical Examination  · Constitutional  o Appearance  o : Well-appearing, well-developed, in no acute distress  · Respiratory  o Respiratory Effort  o : Unlabored breathing  · Cardiovascular  o Heart  o :   § Auscultation of Heart  § : Regular rate and rhythm, no murmurs  · Gastrointestinal  o Abdominal Examination  o : Nontender, nondistended, no rigidity or guarding, no hepatosplenomegaly  · Neurologic  o Mental Status Examination  o :   § Orientation  § : Grossly oriented to person, place and time, judgment and insight intact, normal mood and affect          Results  · In-Office Procedures  o Lab procedure  § Automated Dipstick Urinalysis (Surg Spec) WITHOUT Micro [facility] (14752)   § Color Ur: Yellow   § Clarity Ur: Clear   § Glucose Ur Ql  Strip: Negative   § Bilirub Ur Ql Strip: Negative   § Ketones Ur Ql Strip: Negative   § Sp Gr Ur Qn: 1.020   § Hgb Ur Ql Strip: Large   § pH Ur-LsCnc: 7.0   § Prot Ur Ql Strip: Trace   § Urobilinogen Ur Strip-mCnc: 0.2 E.U./dL   § Nitrite Ur Ql Strip: Negative   § WBC Est Ur Ql Strip: Moderate       Assessment  · Gross hematuria     599.71/R31.0  · History of nephrolithiasis     V13.01/Z87.442  · Malignant neoplasm of lateral wall of urinary bladder     188.2/C67.2    Problems Reconciled  Plan  · Medications  o Medications have been Reconciled  o Transition of Care or Provider Policy  · Instructions  o Electronically Identified Patient Education Materials Provided Electronically       Discussed again with the patient he has T2 bladder cancer which does need treatment.  Patient at this time as is has in the past has declined any interest in cystectomy or XRT of the pelvis.  Also declined any treatment through medical oncology including immunotherapy      At this time he is interested in palliative TURBT.  I did discuss that sometimes this could even make things worse.  Patient voiced understanding I will get surgical risk assessment from his cardiologist, especially since he is been having some chest pain with taking nitroglycerin the last few weeks.  I did discuss with him if this happens again he should go to the emergency room right away.    I will get him in with his cardiologist ASAP.    We will have him come in for office cystoscopy in the coming weeks to assess the amount of tumor in his bladder.    Patient will let me know or come the emergency room if he gets in trouble with bleeding or clot retention.    Greater than 20 minutes was used in counseling and coordination of care, with greater than 51% of this in face-to-face counseling             Electronically Signed by: Antonino Dunn MD -Author on September 8, 2020 01:40:31 PM

## 2021-05-13 NOTE — PROGRESS NOTES
"   Progress Note      Patient Name: Casey Juarez   Patient ID: [id number]   Sex: Male   YOB: 1940    Primary Care Provider: Agnes MARR   Referring Provider: Agnes MARR    Visit Date: August 14, 2020    Provider: Gaurav Hernandez MD   Location: [facility]   Location Address: [address] [place] [address], [facility]   [place]  [id number]   Location Phone: [phone]          Chief Complaint     Routine hospital followup.       History Of Present Illness  REFERRING CARE PROVIDER: Agnes MARR   Casey Juarez is an 79 year old /White male with a history of coronary artery disease status post 5-vessel CABG in 2012 who was hospitalized in June with unstable angina. He underwent a left heart catherization at that time, which showed a widely patent sequential LIMA graft to the LAD and principal diagonal branch, as well as a patent sequential saphenous vein graft to the LM1 and OM2; however, his saphenous vein graft to the RCA was chronically occluded at its ostium, and he was found to have severe diffuse disease in the native proximal to mid RCA with up to 95% stenosis. Successful PCI was performed with Xience Shauna 2.5/18 and 2.25/38 mm drug-eluting stents. His post PCI course was uncomplicated, and he states he has been doing very well since his hospital discharge. Mr. Juarez reports that he \"feels better than I have in several years\" and has increased his physical activity level. His wife states he has been doing frequent yard work and other tasks around the house without any problems. Patient does not report any episodes of chest pain/pressure or any dyspnea, palpitations, orthopnea, PND, peripheral edema, syncope, or lightheadedness. His blood pressure was mildly elevated at 146/64 in the office today, but he reports the vast majority of his blood pressure readings at home have been in the 120s-130s/60s-70s. He was started on dual-antiplatelet " therapy with aspirin and clopidogrel at the time of his PCI, but states he developed terrible itching after taking clopidogrel for several days, as well as episodes of significant bruising. He has only been taking one-half tablet daily of clopidogrel since approximately 1 week after his PCI. He would switch to another antiplatelet medication today if possible.   PAST MEDICAL HISTORY: Coronary artery disease status post 5-vessel CABG; Type 2 diabetes mellitus; Hyperlipidemia; Hypertension; CVA with no residual deficits; Congestive heart failure; Moderate asthma; Stage II bladder cancer; Right carotid endarterectomy; Tonsillectomy.   PSYCHOSOCIAL HISTORY: Previously smoked, but quit in 1981. Rarely consumes alcohol. Denies mood changes or depression.   CURRENT MEDICATIONS: Furosemide 20 mg daily; clopidogrel 75 mg daily; metoprolol  mg daily; aspirin 81 mg daily; losartan 25 mg daily; pravastatin 40 mg daily; montelukast 10 mg daily; amlodipine 10 mg daily; vitamin B12; Humalog insulin.       Review of Systems  · Cardiovascular  o Admits  o : swelling (feet, ankles, hands)  o Denies  o : palpitations (fast, fluttering, or skipping beats), shortness of breath while walking or lying flat, chest pain or angina pectoris   · Respiratory  o Denies  o : chronic or frequent cough, asthma or wheezing      Vitals  Date Time BP Position Site L\R Cuff Size HR RR TEMP (F) WT  HT  BMI kg/m2 BSA m2 O2 Sat HC       08/14/2020 10:56 /70 Sitting    66 - R   223lbs 16oz 6'   30.38 2.27     08/14/2020 10:56 /64 Sitting    62 - R                 Physical Examination  · Respiratory  o Auscultation of Lungs  o : Clear to auscultation bilaterally. No wheezing, rales, or rhonchi. Normal effort. No tachypnea.   · Cardiovascular  o Heart  o : Regular rate and rhythm. Normal S1 and S2. No S3 or S4 gallop. Faint systolic murmur at the apex. No heave or friction rub.  · Gastrointestinal  o Abdominal Examination  o : Soft, obese,  nondistended, nontender. Normoactive bowel sounds throughout. No masses.   · Extremities  o Extremities  o : No cyanosis or clubbing. No edema. 2+ radial pulses bilaterally.          Assessment     1.  Coronary artery disease status post 5-vessel CABG in 2012 and status post PCI to the native RCA in June 2020.    2.  Essential hypertension.  3.  Type 2 diabetes mellitus.  4.  Hyperlipidemia.  5.  History of CVA with on residual deficits.    5.  Stage II bladder cancer, not an operative candidate and he has refused radiation/chemotherapy.         Fortunately, he does not report any episodes of hematuria despite being on dual-antiplatelet therapy.       Plan     1.  Given the patient's significant symptoms on clopidogrel, will switch him to prasugrel 10 mg daily.  I did        instruct him that he will need to take a 60 mg loading dose the first day with 10 mg daily thereafter.  He        was instructed to discontinue clopidogrel at this time.  He appears to be doing well post PCI with no        symptoms reported today.  We will continue chronic medical therapy with Toprol XL, losartan, and        pravastatin, as well as amlodipine.    2.  I instructed him to call the office if he develops any hematuria or other significant bleeding issues.  3.  If he is continuing to do well, I will just see him back for routine followup in the office in 6 months.          Gaurav Hernandez MD  BP:vm             Electronically Signed by: Kely Hartmann-, Other -Author on August 17, 2020 01:32:18 PM  Electronically Co-signed by: Gaurav Hernandez MD -Reviewer on August 17, 2020 07:27:45 PM   no

## 2021-05-13 NOTE — PROGRESS NOTES
Progress Note      Patient Name: Casey Juarez   Patient ID: [id number]   Sex: Male   YOB: 1940    Primary Care Provider: Agnes MARR   Referring Provider: Agnes MARR    Visit Date: September 29, 2020    Provider: Gaurav Hernandez MD   Location: [facility]   Location Address: [address] [place] [address], [address]   [place]  [id number]   Location Phone: [phone]          Chief Complaint  · Follow-up for medication side effects      History Of Present Illness  REFERRING CARE PROVIDER: Agnes MARR   Casey Juarez presents for a routine follow-up today and states he was feeling well. I saw him back in August 2020 and switched him from Clopidogrel to Brilinta due to multiple side effects he was experiencing on Clopidogrel, including severe itching. Unfortunately, he also developed significant side effects on Brilinta, and he insists on discontinuing it at this time. He has a history of coronary artery disease and underwent PCI to his native RCA back in late June 2020 with a Xience Shauna SARMAD. He was also noted to have a widely patent sequential LIMA graft to the LAD and diagonal branch as well as a patent sequential saphenous vein graft to the OM1 and OM2 at that time. Mr. Juarez does not report any episodes of chest pain/pressure or any shortness of breath today. Likewise, no orthopnea, PND, lightheadedness or syncope reported. He has experienced some mild bilateral lower-extremity edema, which always resolves with elevating his legs in the evenings. He does have a history of bladder cancer and is not an operative candidate and has previously refused radiation or chemotherapy. Fortunately, he has not experienced any episodes of hematuria since I saw him last month. He states he was recently diagnosed with a urinary tract infection and is being treated with Ciprofloxacin.   PAST MEDICAL HISTORY: Coronary artery disease, s/p 5-vessel CABG; Type 2 diabetes mellitus;  hyperlipidemia; hypertension; CVA with no residual deficits; congestive heart failure; moderate asthma; Stage II bladder cancer; right carotid endarterectomy; tonsillectomy.   PSYCHOSOCIAL HISTORY: He rarely drinks alcohol. He previously used tobacco but quit.   CURRENT MEDICATIONS: include Furosemide 20 mg daily; Metoprolol  mg daily; B12 1000 mg daily; AREDS; Losartan 25 mg daily; Pravastatin 40 mg daily; Montelukast 10 mg daily; Amlodipine 10 mg daily; ASA 81 mg daily; Humalog; probiotic; Ciprofloxacin. The dosage and frequency of the medications were reviewed with the patient.       Review of Systems  · Cardiovascular  o Admits  o : palpitations (fast, fluttering, or skipping beats), swelling (feet, ankles, hands)  o Denies  o : shortness of breath while walking or lying flat, chest pain or angina pectoris   · Respiratory  o Denies  o : chronic or frequent cough      Vitals  Date Time BP Position Site L\R Cuff Size HR RR TEMP (F) WT  HT  BMI kg/m2 BSA m2 O2 Sat FR L/min FiO2 HC       09/29/2020 02:12 /58 Sitting    70 - R   224lbs 16oz 6'   30.52 2.28       09/29/2020 02:12 /60 Sitting    68 - R                   Physical Examination  · Respiratory  o Auscultation of Lungs  o : Clear to auscultation bilaterally. No rales, rhonchi or wheezing. No tachypnea. Normal effort. No dullness to percussion.  · Cardiovascular  o Heart  o : Regular rate and rhythm. Normal S1 and S2. No S3 gallop. Mild systolic murmur at the apex. No friction rub. PMI non-displaced.  · Gastrointestinal  o Abdominal Examination  o : Soft, obese, nontender, nondistended. normal bowel sounds in all quadrants. No masses. No hepatosplenomegaly.  · Extremities  o Extremities  o : No cyanosis or clubbing. Trace distal left lower extremity edema. 2+ radial pulses bilaterally.          Assessment     1.  Coronary artery disease, s/p 5-vessel CABG in 2012 and status post to the native RCA in June 2020.  2.  Essential hypertension,  suboptimally controlled on current medications.  3.  Hyperlipidemia.  4.  Type II diabetes mellitus.  5.  History of CVA without residual deficits.  6.  Stage II bladder cancer, not an operative candidate, and he has refused radiation/chemotherapy.       Plan     It has been 3 months since his PCI with a Xience Shauna SARMAD, so we can discontinue anti-platelet therapy with Brilinta at this time.  I instructed him to continue aspirin 81 mg daily indefinitely.  He was also instructed to come to the emergency department immediately if he develops substernal chest pain lasting longer than 20-30 minutes.  He understands the risks of stent thrombosis related to discontinuation of Brilinta at this time.  Continue his other chronic cardiac medications.  His blood pressure remains suboptimally controlled, but he states he has trouble tolerating most medications and does not wish to adjust his anti-hypertensive medication regimen today.  If he is continuing to feel well, I will just plan to see him back in the office in one year for reassessment.    Gaurav Hernandez MD  BP/dmd           This note was transcribed by Jayde Montaño.  dmd/BP  The above service was transcribed by Jayde Montaño, and I attest to the accuracy of the note.  BP               Electronically Signed by: Jayde Montaño-, -Author on October 5, 2020 06:19:54 AM  Electronically Co-signed by: Gaurav Hernandez MD -Reviewer on October 23, 2020 10:10:41 AM

## 2021-05-14 NOTE — PROGRESS NOTES
"   Progress Note      Patient Name: Casey Juarez   Patient ID: [id number]   Sex: Male   YOB: 1940    Primary Care Provider: Agnes MARR   Referring Provider: Agnes MARR    Visit Date: February 12, 2021    Provider: Antonino Dunn MD   Location: [facility]   Location Address: [address] [place] [address]  [id number]   Location Phone: [phone]          Chief Complaint  · Pt is here for urological concerns      History Of Present Illness  TELEHEALTH TELEPHONE VISIT  Casey Juarez is a 80 year old /White male who is presenting for evaluation via telehealth telephone visit. Verbal consent obtained before beginning visit.   Provider spent 11 minutes with the patient during the telehealth visit.   The following staff were present during this visit: PRABHU Corral RN   Past Medical History/ Overview of Patient Symptoms     60    PVR     3/20  23     No prostate meds.  on a diuretic.    Patient has had greater than in kidney stones, he has never had lithotripsy    No  family history.            \">80-year-old  gentleman with a history of nephrolithiasis also with T2 TCC.      No GH/burning or dysuria.  Occasional temporary back pain.  No major issues    No UTIs    Voiding without issue.      Off  Anticoagulation    Patient has started immunotherapy with Dr. Covington.  He has had 2 treatments.    Resected over the patient's left ureteral orifice at his last surgery so he comes in today with renal ultrasound    2/21 renal ultrasound -mild to moderate hydronephrosis on the left, ill-defined mass in the posterior urinary bladder.    Previous    2012 CABG.  Cornary stent placed in 6/20.  Asthma.  Former smoke, positive for diabetes, history of CVA    6/20 creatinine 1.0, GFR 63    9/20 urethral dilation before catheter to be placed    TCC    10/22/2020 TURBT - resected all tumor I could safely.  Bladder was very large and I could not reach the " top part of the tumor.  Did resect over the left ureteral orifice but we had good efflux of indigo carmine.  PathologyT2 high-grade TCC    4/6/2020 TURBT/clot evacuationone third of his bladder is full of clots, large sessile bladder tumor left lateral wall.  I resected everything that looked abnormal but there is still some erythema surrounding this area.  path -TCC, invasive, high-grade, muscularis propria present and involved, T2    4/5/2020 CT abdomen/pelvis withmass involving the urinary bladder.  No definite enlarged pelvic lymphadenopathy.  No hydronephrosis    4/5/2020 creatinine 0.98, GFR > 60    PVR     3/20  23     No prostate meds.  on a diuretic.    Patient has had greater than in kidney stones, he has never had lithotripsy    No  family history.                   Past Medical History  Allergic rhinitis, chronic; Arthritis; Asthma; Bladder Cancer; Bladder Disorder; Congestive heart failure; Diabetes; Gross hematuria; Heart Disease; High blood pressure; High cholesterol; History of stroke; Kidney stones; Limb Pain; Limb Swelling; Stroke         Past Surgical History  cardiac stents; Open Heart Surgery; Tonsillectomy; TURBT         Medication List  amlodipine 10 mg oral tablet; Aspir-81 81 mg oral tablet,delayed release (DR/EC); Effient 10 mg oral tablet; furosemide 20 mg oral tablet; Humalog Mix 50-50 Insuln U-100 100 unit/mL (50-50) subcutaneous suspension; losartan 25 mg oral tablet; metoprolol succinate 100 mg oral tablet extended release 24 hr; nitroglycerin 0.4 mg sublingual tablet, sublingual; pravastatin 40 mg oral tablet         Allergy List  atenolol; carvedilol; clonidine; doxycycline hyclate; hydralazine; prednisone; Risperdal         Family Medical History  -Father's Family History Unknown; -Mother's Family History Unknown         Social History  Tobacco (Former)         Review of Systems  · Constitutional  o Denies  o : chills, fever  · Gastrointestinal  o Denies  o : nausea, vomiting,  diarrhea, constipation, flank pain              Assessment  · Gross hematuria     599.71/R31.0  · History of nephrolithiasis     V13.01/Z87.442  · Malignant neoplasm of lateral wall of urinary bladder     188.2/C67.2      Plan  · Orders  o Renal ultrasound (62882, 58905) - 599.71/R31.0, V13.01/Z87.442 - 02/12/2021   JEFFERY May 12th at 10:45am  o BMP Non-fasting [facility] ([id number]) - 599.71/R31.0, V13.01/Z87.442 - 02/12/2021  o Physician Telephone Evaluation, 11-20 minutes (40305) - 599.71/R31.0, V13.01/Z87.442, 188.2/C67.2 - 02/13/2021  · Medications  o Medications have been Reconciled  o Transition of Care or Provider Policy  · Instructions  o Electronically Identified Patient Education Materials Provided Electronically     T2 TCC    Patient has declined cystectomy and radiation in the past, he is currently being treated through medical oncology, I will acquire these records    Patient with some moderate left hydronephrosis, we did discuss I do want to monitor this to make sure his kidney function does not get worse.  I will have him follow-up in 3 months with a renal ultrasound and BMP.    We have discussed in the past that at his last TURBT had a hard time stopping the bleeding I do not want to go back in unless patient is having problems.  He is doing well currently.  Patient voiced understanding             Electronically Signed by: Antonino Dunn MD -Author on February 13, 2021 07:46:16 AM

## 2021-05-28 NOTE — PROGRESS NOTES
Patient: PROBUS,WILLIAM DENVER     Acct: [id number]     Report: #[id number]  UNIT #: [id number]     : 1940    Encounter Date:04/15/2020  PRIMARY CARE: MARIA ELENA MOORE  ***Signed***  --------------------------------------------------------------------------------------------------------------------  NURSE INTAKE      Visit Type      New Patient Visit            Chief Complaint      NEW BLADDER CA            Referring Provider/Copies To      Referring Provider:  Antonino Dunn      Primary Care Provider:  MARIA ELENA MOORE      Copies To:   MARIA ELENA MOORE            History and Present Illness      HPI - Oncology Interim      Patient is a very active 78 yo with biopsy-proven invasive, urothelial carcinoma    in the left later wall of the bladder performed on 20 by Dr. Dunn.      Patient reports that he had bleeding in the urine with fei blood for 5 to 6     days.  He was seen twice in the emergency room at his local hospital however     they were unable to control the symptoms.  For this reason he was referred to     [facility] on 2020.  CT scan demonstrated a large clot/mass in     the bladder.  He was seen by urology and taken for cystoscopy with TURBT on     2020.  This revealed muscle invasive urothelial carcinoma.  Patient reports     that after the surgery his hematuria has resolved and he has not noticed it any     longer.  He denies any unusual aches or pains.  No other masses or lymp    hadenopathy.  He reports good appetite and his weight is maintained.  He has     excellent energy level and remains very active.  He does help care for his wife     who has dementia.      Dr. Dunn talked with him about possible options including surgery,     chemotherapy, radiation.  Patient states he was initially resistant to any of     those possibilities but wanted to come in and get a little bit more information.     He presents today for that purpose.            Cancer Details             20 biopsy-proven invasive, urothelial carcinoma in the left later wall      of the bladder performed by Dr. Dunn            Clinical Staging      T2, N0, MX            Treatments      Surgeries      2020 TURBT            Clinical Trial Participant      No            ECOG Performance Status      0            Most Recent Lab Findings      Laboratory Tests      20 21:19            Most Recent Imaging Findings      Patient: PROBUS,WILLIAM DENVER   Acct: #[id number]   Report: #[id number]            UNIT #: [id number]    DOS: 20 2241      INSURANCE:MEDICARE PART A   LOCATION:ER     : 1940            PROVIDERS      ADMITTING:     ATTENDING:       FAMILY:  MARIA ELENA MOORE   ORDERING:  LIYA SHER         OTHER:    DICTATING:  Christian Dodd MD, JR            REQ #:[id number]   EXAM:ABDPELW - CT ABDOMEN  PELVIS w CONTRAST      REASON FOR EXAM:  Abdominal Pain      REASON FOR VISIT:  BLOOD IN URINE            *******Signed******         PROCEDURE:   CT ABDOMEN PELVIS WITH CONTRAST             COMPARISON:   [facility], CT, CTA ABD   PEL W/WO, 3/13/2013,     13:36.             INDICATIONS:   PELVIC PAIN / GROSS HEMATURIA X 2 DAYS.             TECHNIQUE:   After obtaining the patient's consent, 608 CT images were created     with non-ionic       intravenous contrast material.  No oral contrast agent was administered for the     study.             PROTOCOL:     Standard imaging protocol performed                RADIATION:     DLP: 1062.7 mGy*cm          Automated exposure control was utilized to minimize radiation dose.       CONTRAST:   100 cc Isovue 370 I.V.      LABS:     eGFR:  > 60 ml/min/1.73m2             FINDINGS:   Mixed attenuation is seen in the posterior aspect of the urinary     bladder, which includes       soft tissue density and gas.  There is also thickening of the urinary bladder     wall, especially       superiorly and posteriorly.  The  suspected intraluminal urinary bladder soft     tissue mass measures       at least 10.1 x 6.2 x 6.6 cm in AP (anteroposterior), craniocaudal, and     transverse extent,       respectively.  It is well seen on image 96 of series 201, image 94 of series     202, and image 113 of       series 203. The findings are nonspecific.  They may be related to of fungal ball    or mycetoma or       fungal bezoar of the urinary bladder with associated infection, such as a fungal    cystitis.  The       lack of mineralization associated with the soft tissue mass is thought to be aga    inst a fungal       etiology.  A necrotic neoplastic mass with or without associated infection is     also possible.  An       infectious/inflammatory process of the urinary bladder such as related to     adjacent sigmoid colon       cannot be excluded.  For instance, complications of a colovesical fistula,     related to sigmoid       diverticulitis (or colitis) cannot be excluded.  It appears that a fat plane is     preserved between       the sigmoid colon and the superior urinary bladder wall, which would be against     a colovesical       fistula.  Hemorrhage within the urinary bladder is possible.  A urinary bladder     catheter is in       place.  A prostatic origin tumor with associated direct invasion of the base of     the urinary bladder       cannot be excluded but is thought to be less likely.  There is an air-fluid     level within the       urinary bladder, which may be related to recent catheterization.  Again, an     anaerobic cystitis with       associated urinary bladder tumor would be in the differential diagnosis.  There     is mild       age-indeterminate nonspecific inflammatory change about the urinary bladder,     especially anteriorly       and superiorly.  Associated acute prostatitis cannot be excluded, as well.             There are small bilateral inguinal hernias.  They do not contain bowel.  There     is diffuse  colonic       diverticulosis without acute diverticulitis.  No acute colitis or diverticulitis    is seen.  No       hepatic metastases are seen.  No enlarged mesenteric or retroperitoneal lymph     nodes are seen.        There is extensive atherosclerotic change.  The maximum diameter of the     infrarenal abdominal aorta       is approximately 2.6 cm.  There is suspected moderate to high-grade narrowing of    the celiac trunk       at its origin.  There may be mild atherosclerotic narrowing of the superior     mesenteric artery and       the inferior mesenteric artery at their origins.  Single bilateral renal     arteries are seen with       probably moderate-to-high-grade stenoses at their origins.  Extensive     atherosclerotic change       involves the iliofemoral arterial system, bilaterally, as well.  There are     gallstones without acute       cholecystitis.  No acute pancreatitis.  No adrenal mass is seen.  No acute     infiltrate is identified       in the imaged lung bases.  The patient has undergone median sternotomy.      Probably no cardiac       enlargement.  No pleural effusion is seen.  No pericardial effusion is     identified.  No       pneumoperitoneum is seen.  Degenerative changes involve the imaged spine as well    as the bilateral       sacroiliac joints and the bilateral hip joints.  No aggressive osseous lesions a    re appreciated.        There is probably a benign posterior upper pole 2 cm renal cyst involving the     right kidney, seen       previously and not significantly changed.             CONCLUSION:                1. The study is abnormal.  There is a mass involving the urinary bladder, which     contains foci of       gas.  It is somewhat hyperdense compared to the urine.  There is also thickening    of the urinary       bladder wall, especially along its superior and posterior aspects, slightly     greater on the left.        Differential considerations for the findings would  include an infectious process    of the urinary       bladder, such as related to a fungal etiology.  A mycetoma or fungal bezoar of     the urinary bladder       is possible.  However, the lack of calcification would be against such a     finding.  A necrotic       urinary bladder origin tumor with or without superimposed infection would be in     the differential       diagnosis.  An infectious process of the urinary bladder, such as related to a     complication of a       colovesical fistula is also possible.  However, a fat plane is thought to be     preserved between the       urinary bladder wall and the sigmoid colon, which would be against such a     possibility.  Tumor of       the prostate gland with associated direct invasion of the urinary bladder and     superimposed       infection is possible but thought to be less likely, as well.  Consider Urology     consultation for       further assessment and treatment.               2. No definite enlarged intrapelvic lymph nodes are appreciated.               3. No bowel obstruction is seen.  No definite acute colitis or diverticulitis is    appreciated.  No       acute appendicitis.  No pneumoperitoneum.  No diverticular abscess is suspected.             4. There is a urinary bladder catheter in place.               5. Associated acute prostatitis cannot be excluded.               6. There are gallstones without acute cholecystitis.  No biliary ductal     dilatation is suggested.        No acute pancreatitis.               7. No hydronephrosis.  No obstructive uropathy.  No acute pyelonephritis is     suggested.  No gas is       seen in the bilateral ureters or pelvicaliceal systems.  No definite urinary     tract stones are seen.              8. No other definite acute findings are seen.               9. Please see above comments for further detail.               JESISCA MCDONALD JR, MD             Electronically Signed and Approved By: JESSICA MCDONALD JR, MD  on 2020 at     0:09                        Until signed, this is an unconfirmed preliminary report that may contain      errors and is subject to change.                                              EDWARDO:      D:20 0009            PAST, FAMILY   Past Medical History      Past Medical History:  Bleeding Condition, COPD, Diabetes Type 2, Heart Attack,     High Cholesterol, Hypertension, Stroke      Other PMH:        Asthma      CHF      Hematology/Oncology (M):  Bladder Cancer            Past Surgical History            5 vessel CABG 2012      carotid endarterectomy            Family History      Family History:  Lung Cancer (brother)            mother  at 81 with complications of broken hip from a fall      Father  at 78 with lung cancer      sister  during child birth in her 20's            Social History      Marital Status:        Lives independently:  Yes      Number of Children:  2      Occupation:  retired      Other Social History:        retired from [employer] for 26 years      very active and volunteers            Tobacco Use      Tobacco status:  Former smoker      Smoking history:  25-50 pack years            Alcohol Use      Alcohol intake:  beer now and then            Substance Use      Substance use:  Denies use            Additional  Information      Additional History:        quit smoking in ; smoked 1 ppd      patient cares for his wife with dementia            REVIEW OF SYSTEMS      General:  Admits: Appetite Change;          Denies: Weight Loss      Eye:  Denies Corrective Lenses, Denies Vision Changes      ENT:  Admits Hearing Loss; Denies Sore Throat      Cardiovascular:  Denies Chest Pain, Denies Palpitations      Respiratory:  Admits: Shortness of Air;          Denies: Coughing Blood      Gastrointestinal:  Admits Constipation; Denies Diarrhea      Genitourinary:  Admits Blood in Urine; Denies Incontinence      Musculoskeletal:  Denies Back Pain, Denies Muscle  Pain      Neurologic:  Denies Dizziness; Admits Numbness\Tingling      Psychiatric:  Denies Anxiety, Denies Depression      Hematologic/Lymphatic:  Admits Bruising; Denies Bleeding            VITAL SIGNS AND SCORES      Vitals      Height 5 ft 9.49 in / 176.5 cm      Weight 229 lbs 4.454 oz / 104 kg      BSA 2.20 m2      BMI 33.4 kg/m2      Temperature 98.1 F / 36.72 C - Temporal      Pulse 70      Respirations 20      Blood Pressure 153/53 Sitting, Left Arm      Pulse Oximetry 98%, rm air            Pain Score      Experiencing any pain?:  No      Pain Scale Used:  Numerical      Pain Intensity:  0            Fatigue Score      Experiencing any fatigue?:  No      Fatigue (0-10 scale):  0 (none)            Rehab to be Ordered      Type of Referral to be Ordered:  No needs identified            EXAM      General Appearance:  Positive for: Alert, Cooperative;          Negative for: Acute Distress      Eye:  Positive for: Anicteric Sclerae, Moist Conjunctiva      Neck:  Positive for: Supple;          Negative for: JVD, Masses      Respiratory:  Positive for: CTAB, Normal Respiratory Effort      Other      Well-healed median sternotomy incision      Abdomen/Gastro:  Positive for: Normal Active Bowel Sounds, Soft;          Negative for: Distention, Hepatosplenomegaly, Tenderness      Cardiovascular:  Positive for: RRR;          Negative for: Gallop, Murmur, Peripheral Edema, Rub      Psychiatric:  Positive for: Appropriate Affect, Intact Judgement      Lymphatic:  Negative for: Cervical, Infraclavicular, Supraclavicular            PREVENTION      Hx Influenza Vaccination:  No      2 or More Falls Past Year?:  No      Fall Past Year with Injury?:  No      Hx Pneumococcal Vaccination:  Yes      Encouraged to follow-up with:  PCP regarding preventative exams.      Chart initiated by      CALIXTO PHILLIPS MA            ALLERGY/MEDS      Allergies      Coded Allergies:             GRASS POLLEN-PERENNIAL RYE, STANDAR (Verified   Adverse Reaction, Severe,     hives, 4/15/20)      Uncoded Allergies:             feathers chicken (Allergy, Severe, hives, shortness of air. rash., 8/20/13)           flu shot (Allergy, Severe, shortness of air. hives rash., 8/20/13)           dust (Adverse Reaction, Severe, sneezes, 8/20/13)           leaves (Adverse Reaction, Severe, hives, 8/20/13)           mold (Adverse Reaction, Severe, hives, 8/20/13)            Medications      Last Reconciled on 4/15/20 15:17 by JOSE ALMEIDA      Hydrocodone/Acetaminophen 5/325 MG (Hydrocodone/Acetaminophen 5/325 MG) 1 Tab     Tablet      1 TAB PO Q6H PRN, TAB         Reported         8/21/13       Pyridoxine (Vitamin B6) 50 Mg Tab      100 MG PO QDAY         Reported         8/20/13       Insulin Glargine (Lantus SOLOSTAR) 100 Units/Ml Insuln.pen      UNITS SUBQ HS, UNITS         Reported         8/19/13       Nebivolol HCl (BYSTOLIC) 10 Mg Tablet      10 MG PO HS, TAB         Reported         8/19/13       Cyanocobalamin (Vitamin B-12*) 2,500 Mcg Tab.subl      2500 MCG PO QDAY         Reported         4/8/13       hydrALAZINE HCL (hydrALAZINE HCL) 50 Mg Tablet      50 MG PO TID         Reported         4/8/13       glipiZIDE (glipiZIDE) 10 Mg Tablet      10 MG PO QDAY         Reported         4/8/13       amLODIPine (Norvasc) 10 Mg Tablet      10 MG PO QDAY         Reported         4/8/13       Triamterene/HCTZ 37.5/25 Mg (Triamterene/HCTZ 37.5/25 Mg) 1 Tab Tablet      1 EA PO QDAY         Reported         4/8/13      Medications Reviewed:  No Changes made to meds            IMPRESSION/PLAN      Diagnosis      Bladder cancer         Malignant neoplasm of urinary bladder, unspecified site - C67.9         Bladder location: unspecified site      Patient has biopsy-proven urothelial carcinoma of the bladder.  Status post     TURBT.  Based on staging today, he appears to be stage II (T2, N0).  He will     need CT of the chest to complete his staging evaluation.  We  discussed that     stage II cancer is curable.  Options would include surgical resection +/-     chemotherapy, bladder preserving techniques such as chemotherapy and radiation     or potentially immunotherapy if he does not wish to pursue any other options.  I    did discuss the case with Dr. Dunn, urology.  He states that the patient is     high risk due to his underlying heart problems but he is theoretically a     cystectomy candidate.  Recent lab work looks reasonable to consider     chemotherapy.  He is inclined toward bladder preservation if possible and I     recommended that he see radiation oncology to get a better understanding of what    that would entail.  Radiation is typically given with chemotherapy for     radiosensitization.  I will request PDL 1 testing on his cancer specimen.  He     will be referred to radiation oncology and I will see him back next week for     continued discussion regarding his treatment decisions            Notes      New Diagnostics      * Chest W/ Cont CT, SCHEDULED PROCEDURE         Dx: Bladder cancer - C67.9      New Referrals      * Radiation Therapy, As Soon As Possible         [facility]         Dx: Bladder cancer - C67.9            Patient Education            Bladder Cancer      Patient Education Provided:  Yes            Electronically signed by JOSE ALMEIDA  04/15/2020 15:19       Disclaimer: Converted document may not contain table formatting or lab diagrams. Please see [employer] [facility] for the authenticated document.

## 2021-05-28 NOTE — PROGRESS NOTES
Patient: PROBUS,WILLIAM DENVER     Acct: [id number]     Report: #[id number]  UNIT #: [id number]     : 1940    Encounter Date:2021  PRIMARY CARE: MARIA ELENA MOORE  ***Signed***  --------------------------------------------------------------------------------------------------------------------  NURSE INTAKE      Visit Type      Established Patient Visit            Chief Complaint      BLADDER CA TX            Referring Provider/Copies To      Referring Provider:  Antonino Dunn      Primary Care Provider:  MARIA ELENA MOORE      Copies To:   MARIA ELENA MOORE            History and Present Illness      Past Oncology Illness History      Patient returns to the clinic to discuss immunotherapy options. 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 Dunn.  Patient reports     that he had bleeding in the urine with fei blood for 5 to 6 days.  He was seen    twice in the emergency room at his local hospital however they were unable to     control the symptoms.  For this reason he was referred to [facility]    on 2020.  CT scan demonstrated a large clot/mass in the bladder.  He was     seen by urology and taken for cystoscopy with TURBT on 2020.  This revealed     muscle invasive urothelial carcinoma.              HPI - Oncology Interim      Patient returns for ongoing treatment with Tecentriq for his bladder cancer.  He    is on neoadjuvant immunotherapy based on PD-L1 positivity.       He had his carotid repair last week        Overall he is clinically stable            Cancer Details            20 biopsy-proven invasive, urothelial carcinoma in the left later wall      of the bladder performed by Dr. Dunn 10/23/2020 High grade urothelial      carcinoma invading into lamina propria and muscularis propria            Clinical Staging      T2, N0, MX            Treatments       Surgeries      4/6/2020 TURBT  10/23/20 TURBT            Clinical Trial Participant      No            ECOG Performance Status      0            Most Recent Lab Findings      Laboratory Tests      4/5/21 13:20            Laboratory Tests            Test       3/31/21      05:12             Magnesium Level       1.98 mg/dL      (1.60-2.30)            Most Recent Imaging Findings      CT scan 4/5/21             FINDINGS:         Abdomen:  Included lung bases are predominately clear.             Liver, spleen, adrenal glands, pancreas and gallbladder show no acute     abnormality.  There is a       small amount of sludge or tiny stones in the gallbladder no evidence for active     inflammation.        Uncomplicated colonic diverticulosis.  Moderate colonic stool burden.  Normal     appendix.  New       left-sided hydronephrosis and hydroureter.  The left ureter is dilated to the     level of an irregular       mass in the pelvis measuring 3.3 cm.  The mass is just superior to the bladder.             Pelvis:  Irregular, nodular bladder wall thickening, much greater on the left     than the right.        Overall appearance has increased since the previous study.  Left-sided bladder     wall thickening       measures up to 1.7 cm in thickness.  There is a 1.4 cm enhancing right external     iliac lymph node,       new compared to the prior.  No aggressive appearing bone change.             CONCLUSION:   Interval progression of nodular bladder wall thickening, in basil vitale with provided       history of bladder cancer.             There is a new irregular nodule just superior to the bladder towards the left     that obstructs the       distal left ureter.  There is new left hydronephrosis.             Suspected new ralph metastasis in a right external iliac lymph node.            PAST, FAMILY   Past Medical History      Past Medical History:  Bleeding Condition, COPD, Diabetes Type 2, Heart Attack,     High  Cholesterol, Hypertension, Stroke      Other PMH:        Asthma      CHF      Hematology/Oncology (M):  Bladder Cancer            Past Surgical History            5 vessel CABG       carotid endarterectomy      COLON RESECTION            Family History      Family History:  Lung Cancer            mother  at 81 with complications of broken hip from a fall      Father  at 78 with lung cancer      sister  during child birth in her 20's            Social History      Lives independently:  Yes      Number of Children:  2      Occupation:  retired      Other Social History:        retired from [employer] for 26 years      very active and volunteers            Tobacco Use      Tobacco status:  Former smoker            Substance Use      Substance use:  Denies use            Additional  Information      Additional History:        quit smoking in ; smoked 1 ppd      patient cares for his wife with dementia            REVIEW OF SYSTEMS      General:  Admits: Fatigue, Weight Loss;          Denies: Appetite Change, Fever, Night Sweats, Weight Gain      Eye:  Denies Blurred Vision, Denies Corrective Lenses, Denies Diplopia, Denies     Vision Changes      ENT:  Denies Headache, Denies Hearing Loss, Denies Hoarseness, Denies Sore     Throat      Cardiovascular:  Denies Chest Pain, Denies Palpitations      Respiratory:  Admits: Shortness of Air;          Denies: Cough, Coughing Blood, Productive Cough, Wheezing      Gastrointestinal:  Denies Bloody Stools, Denies Constipation, Denies Diarrhea,     Denies Nausea/Vomiting, Denies Problem Swallowing, Denies Unable to Control     Bowels      Genitourinary:  Denies Blood in Urine, Denies Incontinence, Denies Painful     Urination      Musculoskeletal:  Denies Back Pain, Denies Muscle Pain, Denies Painful Joints      Integumentary:  Denies Itching, Denies Lesions, Denies Rash      Neurologic:  Denies Dizziness, Denies Numbness\Tingling, Denies Seizures      Psychiatric:   Denies Anxiety, Denies Depression      Endocrine:  Denies Cold Intolerance, Denies Heat Intolerance      Hematologic/Lymphatic:  Denies Bruising, Denies Bleeding, Denies Enlarged Lymph     Nodes            VITAL SIGNS AND SCORES      Vitals      Weight 218 lbs 7.614 oz / 99.1 kg      Temperature 97.3 F / 36.28 C - Temporal      Pulse 69      Respirations 20      Blood Pressure 145/54 Sitting      Pulse Oximetry 92%            Pain Score      Experiencing any pain?:  No            Fatigue Score      Experiencing any fatigue?:  Yes      Fatigue (0-10 scale):  5            EXAM      General Appearance:  Positive for: Alert, Oriented x3      Eye:  Positive for: Anicteric Sclerae      Neck:  Positive for: Supple      Respiratory:  Positive for: CTAB      Abdomen/Gastro:  Positive for: Normal Active Bowel Sounds      Cardiovascular:  Positive for: RRR      Psychiatric:  Positive for: AAO X 3      Musculoskeletal:  Positive for: Full ROM Lower Extremety, Full ROM Upper     Extremety      Lower Extremities:  Positive for: Edema            PREVENTION      Hx Influenza Vaccination:  No      Influenza Vaccine Declined:  Yes (ALLERGIC)      2 or More Falls in Past Year?:  No      Fall Past Year with Injury?:  No      Hx Pneumococcal Vaccination:  Yes      Encouraged to follow-up with:  PCP regarding preventative exams. (WANTS COVID     VACCINE)      Chart initiated by      CALIXTO PHILLIPS MA            ALLERGY/MEDS      Allergies      Coded Allergies:             CLOPIDOGREL (Verified  Allergy, Severe, ITCHING , 4/7/21)                  PT CAN TAKE BRAND NAME PLAVIX BUT NOT GENERIC           ATENOLOL (Verified  Allergy, Unknown, does not remember, 4/7/21)           CLONIDINE (Verified  Allergy, Unknown, does not remember , 4/7/21)           HYDRALAZINE (Verified  Allergy, Unknown, does not remember, 4/7/21)           LEVOFLOXACIN (Verified  Allergy, Unknown, does not remember, 4/7/21)           PRASUGREL (Verified  Allergy,  Unknown, does not remember, 4/7/21)           RISPERIDONE (Verified  Allergy, Unknown, does not remember, 4/7/21)           GRASS POLLEN-PERENNIAL RYE, STANDAR (Verified  Adverse Reaction, Severe,     hives, 4/7/21)           DOXYCYCLINE (Verified  Adverse Reaction, Unknown, diarrhea, 4/7/21)           PREDNISONE (Verified  Adverse Reaction, Unknown, diarrhea, 4/7/21)           TICAGRELOR (Verified  Adverse Reaction, Unknown, 4/7/21)      Uncoded Allergies:             feathers chicken (Allergy, Severe, hives, shortness of air. rash., 8/20/13)           flu shot (Allergy, Severe, shortness of air. hives rash., 8/20/13)           dust (Adverse Reaction, Severe, sneezes, 8/20/13)           leaves (Adverse Reaction, Severe, hives, 8/20/13)           mold (Adverse Reaction, Severe, hives, 8/20/13)            Medications      Last Reconciled on 3/17/21 13:31 by JOSE ALMEIDA      Potassium Chloride (K-Dur*) 10 Meq Tab.er.prt      10 MEQ PO QDAY, #30 TAB 0 Refills         Prov: Chasity Miles         3/31/21       Metoprolol Succinate (Metoprolol Succinate) 25 Mg Tab.er.24h      75 MG PO QDAY for 30 Days, #90 TAB.ER         Prov: Chasity Miles         3/31/21       Cefuroxime Axetil (Ceftin) 500 Mg Tablet      500 MG PO BID, #14 TAB         Prov: Chasity Miles         3/31/21       Furosemide (Furosemide) 20 Mg Tablet      60 MG PO QDAY for 30 Days, #90 TAB 0 Refills         Prov: Chasity Miles         3/31/21       oxyCODONE-Acetaminophen 5-325 Mg (oxyCODONE-Acetaminophen 5-325 Mg) 1 Each     Tablet      2 TAB PO Q6H PRN for pain for 3 Days, #24 TAB 0 Refills         Prov: Breezy Romo         3/24/21       Insulin NPL/Insulin Lispro (HumaLOG Mix 75/25 KWIKPEN) 100 Unit/1 Ml Insuln.pen      20 UNITS SUBQ QDAY PRN for BLOOD SUGAR, #1 BOX 0 Refills         Reported         3/18/21       SITagliptin (Januvia) 50 Mg Tablet      50 MG PO C BK PRN for BLOOD SUGAR, TAB         Reported         3/18/21       Pravastatin  Sodium (Pravastatin Sodium) 10 Mg Tablet      10 MG PO HS, TAB         Reported         3/18/21       MDI-Albuterol (Proventil HFA) 6.7 Gm Hfa.aer.ad      2 PUFFS INH Q4H PRN for SHORTNESS OF BREATH, #1 MDI 0 Refills         Reported         3/18/21       Aspirin Chew (Aspirin Baby) 81 Mg Tab.chew      81 MG PO QDAY, #30 TAB.CHEW 0 Refills         Reported         10/16/20       Losartan Potassium (Losartan*) 25 Mg Tablet      25 MG PO HS, #60 TAB 0 Refills         Reported         6/28/20       Montelukast Sodium (Montelukast*) 10 Mg Tablet      10 MG PO HS, TAB         Reported         6/28/20       amLODIPine (amLODIPine) 10 Mg Tablet      10 MG PO QAM, #30 TAB 0 Refills         Reported         6/28/20      Medications Reviewed:  No Changes made to meds            IMPRESSION/PLAN      Impression      Bladder cancer            Plan      Bladder cancer         Malignant neoplasm of urinary bladder, unspecified site         Bladder location: unspecified site      Patient is on Tecentriq having refused other modalities.  Tolerating well.         I discussed results of his scan- evidence for worsening of his cancer       Discussed that there is low possibility of pseudoprogression seen with immune     therapy so will likely  need to consider alternate therapy       Proceed with Tecentriq today in case pseudoprogression.       OV in 1 week to see primary oncologist to discuss options            Patient Education      Patient Education Provided:  Yes            Electronically signed by Tali Eubanks  04/07/2021 11:13       Disclaimer: Converted document may not contain table formatting or lab diagrams. Please see [facility] for the authenticated document.

## 2021-05-28 NOTE — PROGRESS NOTES
Patient: PROBUS,WILLIAM DENVER     Acct: [id number]     Report: #[id number]  UNIT #: [id number]     : 1940    Encounter Date:2021  PRIMARY CARE: MARIA ELENA MOORE  ***Signed***  --------------------------------------------------------------------------------------------------------------------  Chief Complaint      Encounter Date      2021            Primary Care Provider      MARIA ELENA MOORE            Referring Provider            [facility]            Patient Complaint      Patient is complaining of      PT here today for [facility] F/U, Respiratory Failure            VITALS      Height 5 ft 11 in / 180.34 cm      Weight 219 lbs  / 99.859573 kg      BSA 2.19 m2      BMI 30.5 kg/m2      Temperature 96.6 F / 35.89 C - Temporal      Pulse 68      Respirations 17      Blood Pressure 116/60 Sitting, Left Arm      Pulse Oximetry 97%, ROOM AIR            HPI      The patient is an 80 year old male who was recently hospitalized at [facility] from 2021 to 2021 for acute hypoxic     respiratory failure, possible heart failure exacerbation and concern for     multifocal pneumonia.  The patient has had a past medical history significant     for coronary artery disease, status post cardiac stenting, carotid stenosis     status post carotid endartery on 2021, type 2 diabetes mellitus, bladder     cancer, chemotherapy, hypertension and asthma who presented to [facility] after he was transferred from [facility] where he presented     with shortness of breath.  The patient has a home pulse ox and measured his O2     saturations at home and states they were in the 60s prior to going to     [facility] for additional evaluation.  Per report from [facility], a chest     x-ray was obtained and there was lesions and concern for bilateral pneumonia.     There was also concern for heart failure as BNP was greater than 6000.  ABG     showed a PH of 7.39, PCO2  of 43, PO2 of 58 on 3-4 liters of oxygen via nasal     cannula. Because the patient had a recent left sided carotid endarterectomy, a     CT of the neck was obtained and it showed a 3.5 x 3.5 cm hematoma.  Vascular     surgeon on call, Dr. Godinez was made aware of the patient prior to his transfer     and saw him and noted no further intervention required once he was transferred     to [facility] given concern for congestive heart failure and    exacerbation of respiratory status.  There is also concern for PE.  A CT scan of    the chest was ordered and it was negative for PE.  On 03/28/2021, the patient     went into new onset atrial fibrillation with rapid ventricular rate, a Cardizem     drip was started and cardiology was consulted to assist with care.  Metoprolol     was increased and rate was controlled. The patient was not placed on     anticoagulation due to gross hematuria.  The patient has a known history of     bladder cancer and follows with urology and urology was also consulted during     his stay to assist with care.  Per urology, the patient declined cystectomy for     definitive treatment, thus hematuria with clots will be chronic issue for the     patient and no intervention at this time.  The patient was subsequently not     started on anticoagulation due to his bleeding. The patient was notified of     stroke with afib.  Pulmonary was also consulted to assist with care for acute     hypoxemic respiratory failure due combination of pneumonia, heart failure and     asthma.  Acute asthma exacerbation caused multifactorial pulmonary edema,     allergens and pneumonia.  Bilateral community acquired pneumonia was present on     admission and treated with Rocephin and Azithromycin and cultures were negative     as of 03/31/2021.  The patient was discharged home. The patient states he is     feeling much better since hospital stay.  The patient states that he was     diagnosed with  asthma as a child, states he is only using Proventil inhaler as     needed and does not want a daily inhaler at this time. The patient states he is     under the care of Dr. Hernandez for a history of congestive heart failure and     coronary artery disease and is also seeing urology for stage 2 bladder cancer.     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     The patient states he did smoke for about 20-25 years, however quit back in     1981.  The patient states he is taking Singulair everyday as prescribed. The     patient currently denies any fever, chills, night sweats, hemoptysis, purulent     sputum production, chest pain, chest tightness, swollen glands in the head and     neck, abdominal pain, nausea, vomiting or diarrhea.   The patient denies any hea    daches, myalgias, sore throat, changes in sense of taste and/or smell or any     other coronavirus or flu-like symptoms.  The patient denies any leg swelling,     paroxysmal nocturnal dyspnea or orthopnea.  The patient states he is able to     perform ADLs without difficulty.            I have personally reviewed the review of systems, past family, social, surgical     and medical histories and I agree with those as entered in the chart.      Copies To:   JOSÉ MIGUEL CASTELLON      Constitutional:  Denies: Fatigue, Fever, Weight gain, Weight loss, Chills,     Insomnia, Other      Respiratory/Breathing:  Denies: Shortness of air, Wheezing, Cough, Hemoptysis,     Pleuritic pain, Other      Endocrine:  Denies: Polydipsia, Polyuria, Heat/cold intolerance, Diabetes, Other      Eyes:  Denies: Blurred vision, Vision Changes, Other      Ears, nose, mouth, throat:  Denies: Mouth lesions, Thrush, Throat pain,     Hoarseness, Allergies/Hay Fever, Post Nasal Drip, Headaches, Recent Head Injury,    Nose Bleeding, Neck Stiffness, Thyroid Mass,  Hearing Loss, Ear Fullness, Dry     Mouth, Nasal or Sinus Pain, Dry Lips, Nasal discharge, Nasal congestion, Other      Cardiovascular:  Denies: Palpitations, Syncope, Claudication, Chest Pain, Wake     up Gasping for air, Leg Swelling, Irregular Heart Rate, Cyanosis, Dyspnea on     Exertion, Other      Gastrointestinal:  Denies: Nausea, Constipation, Diarrhea, Abdominal pain,     Vomiting, Difficulty Swallowing, Reflux/Heartburn, Dysphagia, Jaundice,     Bloating, Melena, Bloody stools, Other      Genitourinary:  Denies: Urinary frequency, Incontinence, Hematuria, Urgency,     Nocturia, Dysuria, Testicular problems, Other      Musculoskeletal:  Denies: Joint Pain, Joint Stiffness, Joint Swelling, Myalgias,    Other      Hematologic/lymphatic:  DENIES: Lymphadenopathy, Bruising, Bleeding tendencies,     Other      Neurological:  Denies: Headache, Numbness, Weakness, Seizures, Other      Psychiatric:  Denies: Anxiety, Appropriate Effect, Depression, Other      Sleep:  No: Excessive daytime sleep, Morning Headache?, Snoring, Insomnia?, Stop    breathing at sleep?, Other      Integumentary:  Denies: Rash, Dry skin, Skin Warm to Touch, Other      Immunologic/Allergic:  Denies: Latex allergy, Seasonal allergies, Asthma,     Urticaria, Eczema, Other      Immunization status:  No: Up to date            FAMILY/SOCIAL/MEDICAL HX      Surgical History:  Yes: Bladder Surgery (CYSTOSCOPIES), CABG (5 VESSEL 2012),     Head Surgery (SKIN CANCER REMOVED  FROM FACE. left cheek), Oral Surgery     (TONSILLECTOMY), Vascular Surgery (R LEG USE TO HARVEST VESSELS. R carotid     surgery 09/20/13. dr martin); No: Abdominal Surgery      Smoking status:  Former smoker (SMOKER X25 YEARS, 1 PPD, QUIT 1981)      Anticoagulation Therapy:  No      Antibiotic Prophylaxis:  No      Medical History:  Yes: Arthritis (GENERALIZED), Asthma (INHALER AS NEEDED),     Chemotherapy/Cancer (SKIN CANCER , BLADDER CA IMMUN [id number]), Congestive     Heart  Failu (LAST EPISODE 6/2020, +PEDAL EDEMA), Diabetes (DMII, JANUVEA BS >150    HUMALOG INS. FOR BS .200/), Heart Attack (CABG 2-22-12, 5 VESSEL), High Blood     Pressure (CONTROLLED WITH MEDS ), High Cholesterol, Shortness Of Breath; No:     Blood Disease, Deafness or Ringing Ears, Hemorrhoids/Rectal Prob, Miscellaneous     Medical/oth      Psychiatric History      none            PREVENTION      Hx Influenza Vaccination:  No      Influenza Vaccine Declined:  Yes (ALLERGIC)      2 or More Falls in Past Year?:  No      Fall Past Year with Injury?:  No      Hx Pneumococcal Vaccination:  Yes      Encouraged to follow-up with:  PCP regarding preventative exams. (WANTS COVID     VACCINE)      Chart initiated by      Emily Lock CMA            ALLERGIES/MEDICATIONS      Allergies:        Coded Allergies:             CLOPIDOGREL (Verified  Allergy, Severe, ITCHING , 4/15/21)                  PT CAN TAKE BRAND NAME PLAVIX BUT NOT GENERIC           ATENOLOL (Verified  Allergy, Unknown, does not remember, 4/15/21)           CLONIDINE (Verified  Allergy, Unknown, does not remember , 4/15/21)           HYDRALAZINE (Verified  Allergy, Unknown, does not remember, 4/15/21)           LEVOFLOXACIN (Verified  Allergy, Unknown, does not remember, 4/15/21)           PRASUGREL (Verified  Allergy, Unknown, does not remember, 4/15/21)           RISPERIDONE (Verified  Allergy, Unknown, does not remember, 4/15/21)           GRASS POLLEN-PERENNIAL RYE, STANDAR (Verified  Adverse Reaction, Severe,     hives, 4/15/21)           DOXYCYCLINE (Verified  Adverse Reaction, Unknown, diarrhea, 4/15/21)           PREDNISONE (Verified  Adverse Reaction, Unknown, diarrhea, 4/15/21)           TICAGRELOR (Verified  Adverse Reaction, Unknown, 4/15/21)      Uncoded Allergies:             feathers chicken (Allergy, Severe, hives, shortness of air. rash., 8/20/13)           flu shot (Allergy, Severe, shortness of air. hives rash., 8/20/13)           dust  (Adverse Reaction, Severe, sneezes, 8/20/13)           leaves (Adverse Reaction, Severe, hives, 8/20/13)           mold (Adverse Reaction, Severe, hives, 8/20/13)      Medications    Last Reconciled on 4/16/21 11:09 by NELSON CLIFFORD,       Potassium Chloride (K-Dur*) 10 Meq Tab.er.prt      10 MEQ PO QDAY, #30 TAB 0 Refills         Prov: Chasity Miles         3/31/21       Metoprolol Succinate (Metoprolol Succinate) 25 Mg Tab.er.24h      75 MG PO QDAY for 30 Days, #90 TAB.ER         Prov: Chasity Miles         3/31/21       Furosemide (Furosemide) 20 Mg Tablet      60 MG PO QDAY for 30 Days, #90 TAB 0 Refills         Prov: Winterala,Anesh         3/31/21       Insulin NPL/Insulin Lispro (HumaLOG Mix 75/25 KWIKPEN) 100 Unit/1 Ml Insuln.pen      20 UNITS SUBQ QDAY PRN for BLOOD SUGAR, #1 BOX 0 Refills         Reported         3/18/21       SITagliptin (Januvia) 50 Mg Tablet      50 MG PO C BK PRN for BLOOD SUGAR, TAB         Reported         3/18/21       Pravastatin Sodium (Pravastatin Sodium) 10 Mg Tablet      10 MG PO HS, TAB         Reported         3/18/21       MDI-Albuterol (Proventil HFA) 6.7 Gm Hfa.aer.ad      2 PUFFS INH Q4H PRN for SHORTNESS OF BREATH, #1 MDI 0 Refills         Reported         3/18/21       Aspirin Chew (Aspirin Baby) 81 Mg Tab.chew      81 MG PO QDAY, #30 TAB.CHEW 0 Refills         Reported         10/16/20       Losartan Potassium (Losartan*) 25 Mg Tablet      25 MG PO HS, #60 TAB 0 Refills         Reported         6/28/20       Montelukast Sodium (Montelukast*) 10 Mg Tablet      10 MG PO HS, TAB         Reported         6/28/20       amLODIPine (amLODIPine) 10 Mg Tablet      10 MG PO QAM, #30 TAB 0 Refills         Reported         6/28/20      Current Medications      Current Medications Reviewed 4/16/21            EXAM      Vital Signs Reviewed.      General:  WDWN, Alert, NAD.      HEENT: PERRL, EOMI.  OP, nares clear, no sinus tenderness.      Neck: Supple, no JVD, no  thyromegaly.      Lymph: No axillary, cervical, supraclavicular lymphadenopathy noted bilaterally.      Chest: Good aeration, clear to auscultation bilaterally, tympanic to percussion     bilaterally, no work of breathing noted.      CV: RRR, no MGR, no peripheral edema.        Abd: Soft, NT, ND, +BS, no HSM.      EXT: No clubbing, no cyanosis, no edema, no joint tenderness.        Neuro:  A  Skin: No rashes or lesions.      Vtials      Vitals:             Height 5 ft 11 in / 180.34 cm           Weight 219 lbs  / 99.731615 kg           BSA 2.19 m2           BMI 30.5 kg/m2           Temperature 96.6 F / 35.89 C - Temporal           Pulse 68           Respirations 17           Blood Pressure 116/60 Sitting, Left Arm           Pulse Oximetry 97%, ROOM AIR            REVIEW      Results Reviewed      PCCS Results Reviewed?:  Yes Prev Lab Results, Yes Prev Radiology Results, Yes     Previous Mecial Records      Lab Results      I reviewed patient's discharge summary from recent hospital stay and also     reviewed patient's chest CT report dated from 2021 and chest x-ray dated 0    3/27/2021.      Radiographic Results               [facility]                PACS RADIOLOGY REPORT            Patient: PROBUS,WILLIAM DENVER   Acct: #[id number]   Report: #[id number]            UNIT #: [id number]    DOS: 21 0759      INSURANCE:MEDICARE PART A   LOCATION:[facility]  3008-01   : 1940            PROVIDERS      ADMITTING:  WESLEY MATHEWS   ATTENDING: WESLEY MATHEWS      FAMILY:  NONE,MD   ORDERING:  Jeffry Shell         OTHER:    DICTATING:  Librado Cruz MD            REQ #:[id number]   EXAM:CXR1 - CHEST 1 View AP PA      REASON FOR EXAM:  SOA, PNA      REASON FOR VISIT:  HYPOXIA,PNA,POSSIBLE HEART FAILURE,R/O COVID            *******Signed******         PROCEDURE:   CHEST AP/PA SINGLE VIEW             COMPARISON:   [facility], CT, CHEST W/  CONTRAST,     2020, 10:03.  Other, CT,       SOFT TISSUE NECK W/ CONTRAST, 3/27/2021, 2:39.  [facility], CR,     CHEST AP/PA 1 VIEW,       2020, 20:29.             INDICATIONS:   SOA, PNA             FINDINGS:         Patchy airspace opacities are noted in the mid and lower lung fields     bilaterally.  The cardiac and       mediastinal silhouettes appear normal.  No effusion is seen.  A CABG has been     performed.             CONCLUSION:         1. Patchy bilateral airspace opacities favored to represent pneumonia.      Correlate clinically the       possibility of COVID-19 infection              Librado Cruz M.D.             Electronically Signed and Approved By: Librado Cruz M.D. on 3/27/2021 at 13:23                                  Until signed, this is an unconfirmed preliminary report that may contain      errors and is subject to change.                                              WURRO:      D:21 1323               [facility]                PACS RADIOLOGY REPORT            Patient: PROBUS,WILLIAM DENVER   Acct: #[id number]   Report: #[id number]            UNIT #: [id number]    DOS: 21 1226      INSURANCE:MEDICARE PART A   LOCATION:[facility]  [address]   : 1940            PROVIDERS      ADMITTING:  WESLEY MATHEWS   ATTENDING: WESLEY MATHEWS      FAMILY:  NONE,MD   ORDERING:  WESLEY MATHEWS         OTHER:    DICTATING:  Librado Cruz MD            REQ #:[id number]   EXAM:CH[facility] - CT CHEST with CONTRAST      REASON FOR EXAM:  Rule out PE      REASON FOR VISIT:  HYPOX RESP FAILURE,COVID R/O,HF EXAC            *******Signed******         PROCEDURE:   CT CHEST W/ CONTRAST             COMPARISON:   Other, CT, CTA CHEST NONCORONARY, 3/27/2021, 3:40.             INDICATIONS:   SHORTNESS OF BREATH             TECHNIQUE:   After obtaining the patient's consent, CT images were obtained with    non-ionic       intravenous  contrast material.               PROTOCOL:     Pulmonary embolism imaging protocol performed                RADIATION:     DLP: 1395 mGy*cm          Automated exposure control was utilized to minimize radiation dose.       CONTRAST:   75 cc Isovue 370 I.V.      LABS:     eGFR: >60 ml/min/1.73m2             FINDINGS:         There is no evidence of pulmonary embolism.             A borderline enlarged 1.0 cm subcarinal lymph node is noted.  Paraesophageal     adenopathy is noted       measuring up to 1.4 cm. Calcified mediastinal lymph nodes are consistent with     previous       granulomatous disease.  No hilar or axillary adenopathy is seen.  Small     bilateral pleural effusions       are noted.             Airspace opacities are noted in the mid and lower lung fields bilaterally.      There is mosaic       attenuation in the right lung consistent with small vessel or small airways     disease.             Sludge and/or small stones are noted in the gallbladder.  The visualized upper     abdomen otherwise       appears unremarkable.  There is an old right clavicular fracture.             CONCLUSION:         1. No evidence pulmonary embolism      2. Small bilateral pleural effusions      3. Paraesophageal adenopathy may be secondary to pneumonia or, less likely,     neoplasm.      4. Airspace opacities in the mid and lower lung fields bilaterally may represent    pneumonia or       atelectasis.      5. Small airways versus small vessel disease in the right lung      6. Cholelithiasis              Librado Cruz M.D.             Electronically Signed and Approved By: Librado Cruz M.D. on 3/27/2021 at 16:49                                  Until signed, this is an unconfirmed preliminary report that may contain      errors and is subject to change.                                              WURRO:      D:03/27/21 1649            Assessment      Asthma - J45.909            Pneumonia - J18.9            Bladder cancer  - C67.9            Notes      Discontinued Medications      * oxyCODONE-Acetaminophen 5-325 Mg 1 EACH TABLET: 2 TAB PO Q6H PRN pain 3 Days       #24      * Cefuroxime Axetil (Ceftin) 500 MG TABLET: 500 MG PO BID #14      New Diagnostics      * PFT Comp PrePost DLCO BodBx sx, 1 DAY         Dx: Asthma - J45.909      * 6 Min Walk w O2 Titration Test, Routine         Dx: Asthma - J45.909      * Chest W/O Cont CT, 6 WEEKS         Dx: Pneumonia - J18.9      IMPRESSION:      1. Acute hypoxemic respiratory failure due to a combination of pneumonia, heart     failure and asthma.      2. Acute asthma exacerbation, cause multifactorial, pulmonary edema, allergens     and pneumonia.      3. Bilateral community acquired pneumonia present on admission.      4. Obstructive sleep apnea, mild to moderate per patient, improved with weight     loss and positional therapy.      5. Type 2 diabetes.      6. Acute on chronic systolic heart failure with EF of 45%, patient under the     care of Dr. Hernandez.      7. Paroxysmal atrial fibrillation with right ventricular rate, now sinus rhythm.            8. Hematuria due to bladder cancer, the patient under the care of urology.      9. Hypertension.      10. Coronary artery disease, status post CABG, EF 45%, patient under the care of    Dr. Hernandez.      11. Peripheral vascular disease, patient under the care of Dr. Romo.      12. History of CVA in the past.      13. Tobacco abuse of cigarettes in remission, patient reports he quit smoking in    1981.              PLAN:      1.  The patient to continue Proventil inhaler as needed. The patient is advised     to use his Proventil inhaler 2-3 times a week and to notify our office if he     needs step up therapy.      2.  I will order a pulmonary function test and six minute walk test.      3.  Alpha 1 antitrypsin level drawn in the office today.      4. I will repeat a chest CT scan again in six weeks to ensure resolution of     pneumonia.      5.  Follow up with Dr. Hernandez as scheduled.      6.  The patient is advised to follow up with urology as scheduled.      7. The patient is advised to follow up with Dr. Romo.      8.  Patient is advised to call the office, 911 or go to the ER with any new or     worsening symptoms.      9.  The patient is to use Singulair everyday as prescribed.      10.  The patient reports he is up-to-date with his COVID vaccine, however     refuses his flu and pneumonia vaccines.  The risks of refusing vaccines     discussed with patient and patient verbalized understanding.  The patient is     advised to continue to follow CDC recommendations of social distancing, wearing     a mask and washing hands for at least 20 seconds.        11.  Follow up in 6-8 weeks, sooner if needed.            Patient Education      Patient Education Provided:  Acute Asthma      Time Spent:  > 50% /Coord Care            Electronically signed by NELSON CLIFFORD [facility]  04/20/2021 12:59       Disclaimer: Converted document may not contain table formatting or lab diagrams. Please see [facility] for the authenticated document.

## 2021-05-28 NOTE — PROGRESS NOTES
Patient: PROBUS,WILLIAM DENVER     Acct: [id number]     Report: #[id number]  UNIT #: [id number]     : 1940    Encounter Date:2021  PRIMARY CARE: MARIA ELENA MOORE  ***Signed***  --------------------------------------------------------------------------------------------------------------------  NURSE INTAKE      Visit Type      Established Patient Visit            Chief Complaint      BLADDER CANCER TX            Referring Provider/Copies To      Referring Provider:  Antonino Dunn      Primary Care Provider:  MARIA ELENA MOORE      Copies To:   MARIA ELENA MOORE            History and Present Illness      Past Oncology Illness History      Patient returns to the clinic to discuss immunotherapy options. 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 Dunn.  Patient reports     that he had bleeding in the urine with fei blood for 5 to 6 days.  He was seen    twice in the emergency room at his local hospital however they were unable to     control the symptoms.  For this reason he was referred to [facility]    on 2020.  CT scan demonstrated a large clot/mass in the bladder.  He was     seen by urology and taken for cystoscopy with TURBT on 2020.  This revealed     muscle invasive urothelial carcinoma.              HPI - Oncology Interim      Patient returns for ongoing treatment with Tecentriq for his bladder cancer.  He    is on neoadjuvant immunotherapy based on PD-L1 positivity.  He states he is     tolerating the infusions well.  He is due for cycle 4.  Since his last visit, he    states that he was diagnosed with a urinary tract infection.  He is finished a     course of antibiotics yesterday.  He notes that the urine is not painful.  He     has noticed a couple of clots the day before and yesterday but he has not had     any blood clots or blood in the urine today.  He  denies any masses or     adenopathy.  He reports adequate energy level.  He is eating and drinking well.     He denies diarrhea.            Cancer Details            20 biopsy-proven invasive, urothelial carcinoma in the left later wall      of the bladder performed by Dr. Dunn 10/23/2020 High grade urothelial      carcinoma invading into lamina propria and muscularis propria            Clinical Staging      T2, N0, MX            Treatments      Surgeries      2020 TURBT  10/23/20 TURBT            Clinical Trial Participant      No            ECOG Performance Status      0            Most Recent Lab Findings      Laboratory Tests      3/17/21 09:44            PAST, FAMILY   Past Medical History      Past Medical History:  Bleeding Condition, COPD, Diabetes Type 2, Heart Attack,     High Cholesterol, Hypertension, Stroke      Other PMH:        Asthma      CHF      Hematology/Oncology (M):  Bladder Cancer            Past Surgical History            5 vessel CABG       carotid endarterectomy      COLON RESECTION            Family History      Family History:  Lung Cancer            mother  at 81 with complications of broken hip from a fall      Father  at 78 with lung cancer      sister  during child birth in her 20's            Social History      Lives independently:  Yes      Number of Children:  2      Occupation:  retired      Other Social History:        retired from [employer] for 26 years      very active and volunteers            Tobacco Use      Tobacco status:  Former smoker            Substance Use      Substance use:  Denies use            Additional  Information      Additional History:        quit smoking in ; smoked 1 ppd      patient cares for his wife with dementia            REVIEW OF SYSTEMS      General:  Denies: Fatigue      ENT:  Denies Headache      Respiratory:  Admits: Shortness of Air;          Denies: Wheezing      Gastrointestinal:  Admits Constipation; Denies  Nausea/Vomiting      Genitourinary:  Admits Blood in Urine      Musculoskeletal:  Denies Painful Joints      Integumentary:  Denies Itching      Neurologic:  Admits Dizziness, Admits Numbness\Tingling            VITAL SIGNS AND SCORES      Vitals      Weight 224 lbs 3.325 oz / 101.7 kg      Temperature 97.8 F / 36.56 C - Temporal      Pulse 70      Respirations 20      Blood Pressure 152/54 Sitting      Pulse Oximetry 95%, RM AIR            Pain Score      Pain Scale Used:  Numerical      Pain Intensity:  0            Fatigue Score      Fatigue (0-10 scale):  0 (none)            EXAM      General Appearance:  Positive for: Alert, Cooperative;          Negative for: Acute Distress      Neck:  Positive for: Supple;          Negative for: JVD, Masses      Respiratory:  Positive for: CTAB, Normal Respiratory Effort      Abdomen/Gastro:  Positive for: Normal Active Bowel Sounds, Soft;          Negative for: Hepatosplenomegaly, Tenderness      Cardiovascular:  Positive for: RRR;          Negative for: Gallop, Murmur, Peripheral Edema, Rub      Psychiatric:  Positive for: Appropriate Affect, Intact Judgement      Lymphatic:  Negative for: Cervical, Infraclavicular, Supraclavicular            PREVENTION      Hx Influenza Vaccination:  No      Influenza Vaccine Declined:  Yes (ALLERGIC)      2 or More Falls in Past Year?:  No      Fall Past Year with Injury?:  No      Hx Pneumococcal Vaccination:  Yes      Encouraged to follow-up with:  PCP regarding preventative exams. (WANTS COVID     VACCINE)      Chart initiated by      MELCHOR CHEEK MA            ALLERGY/MEDS      Allergies      Coded Allergies:             ATENOLOL (Verified  Allergy, Unknown, does not remember, 3/17/21)           CLONIDINE (Verified  Allergy, Unknown, does not remember , 3/17/21)           HYDRALAZINE (Verified  Allergy, Unknown, does not remember, 3/17/21)           LEVOFLOXACIN (Verified  Allergy, Unknown, does not remember, 3/17/21)            PRASUGREL (Verified  Allergy, Unknown, does not remember, 3/17/21)           RISPERIDONE (Verified  Allergy, Unknown, does not remember, 3/17/21)           GRASS POLLEN-PERENNIAL RYE, STANDAR (Verified  Adverse Reaction, Severe,     hives, 3/17/21)           CLOPIDOGREL (Verified  Adverse Reaction, Unknown, ITCHING , 3/17/21)           DOXYCYCLINE (Verified  Adverse Reaction, Unknown, diarrhea, 3/17/21)           PREDNISONE (Verified  Adverse Reaction, Unknown, diarrhea, 3/17/21)           TICAGRELOR (Verified  Adverse Reaction, Unknown, 3/17/21)      Uncoded Allergies:             feathers chicken (Allergy, Severe, hives, shortness of air. rash., 8/20/13)           flu shot (Allergy, Severe, shortness of air. hives rash., 8/20/13)           dust (Adverse Reaction, Severe, sneezes, 8/20/13)           leaves (Adverse Reaction, Severe, hives, 8/20/13)           mold (Adverse Reaction, Severe, hives, 8/20/13)            Medications      Last Reconciled on 3/17/21 13:31 by JOSE ALMEIDA      Aspirin Chew (Aspirin Baby) 81 Mg Tab.chew      81 MG PO QDAY, #30 TAB.CHEW 0 Refills         Reported         10/16/20       Losartan Potassium (Losartan*) 25 Mg Tablet      25 MG PO HS, #60 TAB 0 Refills         Reported         6/28/20       Montelukast Sodium (Montelukast*) 10 Mg Tablet      10 MG PO HS, TAB         Reported         6/28/20       amLODIPine (amLODIPine) 10 Mg Tablet      10 MG PO QAM, #30 TAB 0 Refills         Reported         6/28/20       Pravastatin Sodium (Pravastatin Sodium) 40 Mg Tablet      40 MG PO HS, #30 TAB 0 Refills         Reported         6/28/20       Metoprolol Succinate (Metoprolol Succinate) 100 Mg Tab.er.24h      100 MG PO QDAY, #30 TAB         Reported         6/28/20       Furosemide (Furosemide) 20 Mg Tablet      20 MG PO QDAY, #30 TAB 0 Refills         Reported         6/28/20      Medications Reviewed:  No Changes made to meds            IMPRESSION/PLAN      Diagnosis      Bladder  cancer         Malignant neoplasm of urinary bladder, unspecified site         Bladder location: unspecified site      Patient is on Tecentriq having refused other modalities.  Tolerating well.  He     is due for cycle 4.  Lab work today looks good.  Proceed with cycle 4 as     planned.  RTC 3 weeks for OV, cycle 5 with labs and restaging scan prior.            Gross hematuria - R31.0      Patient reports recent blood clots in the urine.  This may be related to his UTI    for which he just completed a course of antibiotics.  It could certainly be from    his underlying cancer as well.  He denies any further today.  If it recurs,     follow-up with urology for consideration of cystoscopy            Notes      New Diagnostics      * CT Abd/Pelvis/Chest W/Contrast, 3 Week         Dx: Bladder cancer - C67.9      * CBC With Auto Diff, 3 Week         Dx: Bladder cancer - C67.9      * CMP Comp Metabolic Panel, 3 Week         Dx: Bladder cancer - C67.9            Advanced Care Plan Discussion      Declines Discussion 1124F            Patient Education      Patient Education Provided:  Yes            Electronically signed by JOSE ALMEIDA  03/17/2021 13:31       Disclaimer: Converted document may not contain table formatting or lab diagrams. Please see [employer] for the authenticated document.

## 2021-05-28 NOTE — PROGRESS NOTES
Patient: PROBUS,WILLIAM DENVER     Acct: [id number]     Report: #[id number]  UNIT #: [id number]     : 1940    Encounter Date:12/15/2020  PRIMARY CARE: MARIA ELNEA MOORE  ***Signed***  --------------------------------------------------------------------------------------------------------------------  NURSE INTAKE      Visit Type      Established Patient Visit            Chief Complaint      BLADDER CA F/U            Referring Provider/Copies To      Referring Provider:  Antonino Dunn      Primary Care Provider:  MARIA ELENA MOORE      Copies To:   MARIA ELENA MOORE            History and Present Illness      Past Oncology Illness History      Patient returns to the clinic to discuss immunotherapy options. 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 Dunn.  Patient reports     that he had bleeding in the urine with fei blood for 5 to 6 days.  He was seen    twice in the emergency room at his local hospital however they were unable to     control the symptoms.  For this reason he was referred to [facility]    on 2020.  CT scan demonstrated a large clot/mass in the bladder.  He was     seen by urology and taken for cystoscopy with TURBT on 2020.  This revealed     muscle invasive urothelial carcinoma.              [facility] - Oncology Interim      Patient presents today for discussion of bladder cancer.  He was seen earlier     this year with muscle invasive bladder cancer.  At that time multiple options     were offered to him including surgery, chemo RT, radiation alone, possible     immunotherapy.  He elected not to move forward with any of those options.  He     did not return for any follow-up appointments.  He has been seen by his     urologist a couple of times over the last several months.  He did require a     repeat TURBT for bleeding which showed a large mass in the dome of  the bladder.     He had resection of his much of the tumor as possible.  He states he has had no     further hematuria since that time.  Clinically he is feeling well.  He denies     any masses lymphadenopathy, no unusual aches or pains.  He notes good appetite     and energy level.  He had a recent appointment with his urologist and at that     point expressed interest with immunotherapy.  Urology note reports that he would    be a very poor candidate for cystectomy due to his multiple underlying medical     problems.  He is referred back here for that discussion.            Cancer Details            20 biopsy-proven invasive, urothelial carcinoma in the left later wall      of the bladder performed by Dr. Dunn 10/23/2020 High grade urothelial      carcinoma invading into lamina propria and muscularis propria            Clinical Staging      T2, N0, MX            Treatments      Surgeries      2020 TURBT  10/23/20 TURBT            Clinical Trial Participant      No            ECOG Performance Status      0            PAST, FAMILY   Past Medical History      Past Medical History:  Bleeding Condition, COPD, Diabetes Type 2, Heart Attack,     High Cholesterol, Hypertension, Stroke      Other PMH:        Asthma      CHF      Hematology/Oncology (M):  Bladder Cancer            Past Surgical History            5 vessel CABG       carotid endarterectomy      COLON RESECTION            Family History      Family History:  Lung Cancer            mother  at 81 with complications of broken hip from a fall      Father  at 78 with lung cancer      sister  during child birth in her 20's            Social History      Lives independently:  Yes      Number of Children:  2      Occupation:  retired      Other Social History:        retired from [employer] for 26 years      very active and volunteers            Tobacco Use      Tobacco status:  Former smoker            Substance Use      Substance use:  Denies  use            Additional  Information      Additional History:        quit smoking in 1981; smoked 1 ppd      patient cares for his wife with dementia            REVIEW OF SYSTEMS      General:  Denies: Fatigue      ENT:  Denies Headache, Denies Hearing Loss      Cardiovascular:  Denies Chest Pain      Respiratory:  Admits: Shortness of Air;          Denies: Cough      Gastrointestinal:  Denies Diarrhea, Denies Nausea/Vomiting      Musculoskeletal:  Denies Muscle Pain, Denies Painful Joints            VITAL SIGNS AND SCORES      Vitals      Weight 222 lbs 10.634 oz / 101 kg      Temperature 97 F / 36.11 C - Temporal      Pulse 83      Respirations 24      Blood Pressure 145/66 Sitting, Left Arm      Pulse Oximetry 98%, RM AIR            Pain Score      Pain Scale Used:  Numerical      Pain Intensity:  0            Fatigue Score      Fatigue (0-10 scale):  0 (none)            EXAM      General Appearance:  Positive for: Alert, Cooperative;          Negative for: Acute Distress      Other      Elderly      Eye:  Positive for: Anicteric Sclerae, Moist Conjunctiva      Neck:  Positive for: Supple;          Negative for: JVD, Masses      Respiratory:  Positive for: CTAB, Normal Respiratory Effort      Abdomen/Gastro:  Positive for: Normal Active Bowel Sounds, Soft;          Negative for: Distention, Hepatosplenomegaly, Tenderness      Cardiovascular:  Positive for: RRR;          Negative for: Gallop, Murmur, Peripheral Edema, Rub      Psychiatric:  Positive for: Appropriate Affect, Intact Judgement      Lymphatic:  Negative for: Axillary, Cervical, Infraclavicular, Supraclavicular            PREVENTION      Hx Influenza Vaccination:  No      Influenza Vaccine Declined:  No      2 or More Falls in Past Year?:  No      Fall Past Year with Injury?:  No      Hx Pneumococcal Vaccination:  Yes      Encouraged to follow-up with:  PCP regarding preventative exams.      Chart initiated by      CALIXTO PHILLIPS MA             ALLERGY/MEDS      Allergies      Coded Allergies:             GRASS POLLEN-PERENNIAL RYE, STANDAR (Verified  Adverse Reaction, Severe,     hives, 12/15/20)           CLOPIDOGREL (Verified  Adverse Reaction, Unknown, ITCHING , 12/15/20)           TICAGRELOR (Verified  Adverse Reaction, Unknown, 12/15/20)      Uncoded Allergies:             feathers chicken (Allergy, Severe, hives, shortness of air. rash., 8/20/13)           flu shot (Allergy, Severe, shortness of air. hives rash., 8/20/13)           dust (Adverse Reaction, Severe, sneezes, 8/20/13)           leaves (Adverse Reaction, Severe, hives, 8/20/13)           mold (Adverse Reaction, Severe, hives, 8/20/13)            Medications      Last Reconciled on 12/15/20 12:49 by JOSE ALMEIDA      Aspirin Chew (Aspirin Baby) 81 Mg Tab.chew      81 MG PO QDAY, #30 TAB.CHEW 0 Refills         Reported         10/16/20       Losartan Potassium (Losartan*) 25 Mg Tablet      25 MG PO HS, #60 TAB 0 Refills         Reported         6/28/20       Montelukast Sodium (Montelukast*) 10 Mg Tablet      10 MG PO HS, TAB         Reported         6/28/20       amLODIPine (amLODIPine) 10 Mg Tablet      10 MG PO QAM, #30 TAB 0 Refills         Reported         6/28/20       Pravastatin Sodium (Pravastatin Sodium) 40 Mg Tablet      40 MG PO HS, #30 TAB 0 Refills         Reported         6/28/20       Metoprolol Succinate (Metoprolol Succinate) 100 Mg Tab.er.24h      100 MG PO QDAY, #30 TAB         Reported         6/28/20       Furosemide (Furosemide) 20 Mg Tablet      20 MG PO QDAY, #30 TAB 0 Refills         Reported         6/28/20      Medications Reviewed:  Changes made to meds            IMPRESSION/PLAN      Diagnosis      Bladder cancer         Malignant neoplasm of dome of urinary bladder         Bladder location: dome      Patient has muscle invasive bladder cancer with at least ongoing tumor within     the bladder itself.  He opted for no treatment in the spring and only  recently     represented with gross hematuria and subsequent TURBT.  Patient remains clear     that he is not interested in cystectomy, radiation or traditional chemotherapy.     He is not felt to be a good cystectomy candidate due to his underlying medical     problems.  Likewise he would not be a very good platinum candidate (and he again    refuses that treatment).  His prior biopsy specimen was tested for PD-L1 and is     positive making Tecentriq an option.  This is given every 3 weeks until     progression or intolerance.  I would recommend restaging labs and scans to     reassess the extent of his disease prior to initiating any therapy.  He is     agreeable to that recommendation.  Patient states that he would would agree to     immunotherapy but would like to defer initiating treatment until after the     Christmas holiday.  He will have labs today to ensure adequate endorgan     functions and blood counts.  Written teaching information provided on Tecentriq.     He will have restaging scans as soon as possible.  I will plan his initial     cycle of Tecentriq the week after Christmas.  I will see him for cycle 2, day 1     with labs prior.            Notes      Discontinued Medications      * oxyCODONE-Acetaminophen 5-325 Mg 1 EACH TABLET: 1 TAB PO Q6H PRN SEVERE PAIN       (PAIN LEVEL 7-10) #15      New Diagnostics      * CCC CBC With Auto Diff, 12/15/20         Dx: Bladder cancer - C67.9      * CCC Comp Metabolic Panel, 12/15/20         Dx: Bladder cancer - C67.9      * Tsh (Ultra-Sensitive, 12/15/20         Dx: Bladder cancer - C67.9      * CT Abd/Pelvis/Chest W/Contrast, As Soon As Possible         Dx: Bladder cancer - C67.9            Pain      Pain Zero Today            Advanced Care Plan Discussion      Declines Discussion 1124F            Patient Education            Atezolizumab Injection      Bladder Cancer            Time Spent Counseling Patient      Over 50% Time Counseling Pt:  Yes             Electronically signed by JOSE ALMEIDA  12/15/2020 12:49       Disclaimer: Converted document may not contain table formatting or lab diagrams. Please see [employer] for the authenticated document.

## 2021-05-28 NOTE — PROGRESS NOTES
Patient: PROBUS,WILLIAM DENVER     Acct: [id number]     Report: #[id number]  UNIT #: [id number]     : 1940    Encounter Date:2021  PRIMARY CARE: MARIA ELENA MOORE  ***Signed***  --------------------------------------------------------------------------------------------------------------------  NURSE INTAKE      Visit Type      Established Patient Visit            Chief Complaint      BLADDER CANCER            Referring Provider/Copies To      Referring Provider:  Antonino Dunn      Primary Care Provider:  MARIA ELENA MOORE      Copies To:   MARIA ELENA MOOER            History and Present Illness      Past Oncology Illness History      Patient returns to the clinic to discuss immunotherapy options. 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 Dunn.  Patient reports     that he had bleeding in the urine with fei blood for 5 to 6 days.  He was seen    twice in the emergency room at his local hospital however they were unable to     control the symptoms.  For this reason he was referred to [facility]    on 2020.  CT scan demonstrated a large clot/mass in the bladder.  He was     seen by urology and taken for cystoscopy with TURBT on 2020.  This revealed     muscle invasive urothelial carcinoma.              HPI - Oncology Interim      Pt here for f/u bladder cancer      He is on Tecentriq and due for cycle 3 ( delayed due to weather)       Reports recent UTI for which he recieved an IM injection as he did not want to     take oral antibiotics              denies any fever or chills      has no UTI symptoms            Cancer Details            20 biopsy-proven invasive, urothelial carcinoma in the left later wall      of the bladder performed by Dr. Dunn 10/23/2020 High grade urothelial      carcinoma invading into lamina propria and muscularis propria             Clinical Staging      T2, N0, MX            Treatments      Surgeries      2020 TURBT  10/23/20 TURBT            Clinical Trial Participant      No            ECOG Performance Status      0            Most Recent Lab Findings      Laboratory Tests      21 09:44            PAST, FAMILY   Past Medical History      Past Medical History:  Bleeding Condition, COPD, Diabetes Type 2, Heart Attack,     High Cholesterol, Hypertension, Stroke      Other PMH:        Asthma      CHF      Hematology/Oncology (M):  Bladder Cancer            Past Surgical History            5 vessel CABG       carotid endarterectomy      COLON RESECTION            Family History      Family History:  Lung Cancer            mother  at 81 with complications of broken hip from a fall      Father  at 78 with lung cancer      sister  during child birth in her 20's            Social History      Lives independently:  Yes      Number of Children:  2      Occupation:  retired      Other Social History:        retired from [employer] for 26 years      very active and volunteers            Tobacco Use      Tobacco status:  Former smoker            Substance Use      Substance use:  Denies use            Additional  Information      Additional History:        quit smoking in ; smoked 1 ppd      patient cares for his wife with dementia            REVIEW OF SYSTEMS      General:  Denies: Appetite Change, Fatigue, Fever, Night Sweats, Weight Gain,     Weight Loss      Eye:  Denies Blurred Vision, Denies Corrective Lenses, Denies Diplopia, Denies     Vision Changes      ENT:  Denies Headache, Denies Hearing Loss, Denies Hoarseness, Denies Sore     Throat      Cardiovascular:  Denies Chest Pain, Denies Palpitations      Respiratory:  Denies: Cough, Coughing Blood, Productive Cough, Shortness of Air,    Wheezing      Gastrointestinal:  Denies Bloody Stools, Denies Constipation, Denies Diarrhea,     Denies Nausea/Vomiting, Denies Problem  Swallowing, Denies Unable to Control     Bowels      Genitourinary:  Admits Blood in Urine (NO PAIN WITH URINATION); Denies     Incontinence, Denies Painful Urination      Musculoskeletal:  Admits Back Pain (TAKES TYLENOL); Denies Muscle Pain, Denies     Painful Joints      Integumentary:  Denies Itching, Denies Lesions, Denies Rash      Neurologic:  Denies Dizziness, Denies Numbness\Tingling, Denies Seizures      Psychiatric:  Denies Anxiety, Denies Depression      Endocrine:  Denies Cold Intolerance, Denies Heat Intolerance      Hematologic/Lymphatic:  Denies Bruising, Denies Bleeding, Denies Enlarged Lymph     Nodes            VITAL SIGNS AND SCORES      Vitals      Weight 220 lbs 7.360 oz / 100 kg      Temperature 98.0 F / 36.67 C - Temporal      Pulse 66      Respirations 18      Blood Pressure 145/63 Sitting      Pulse Oximetry 97%, RM AIR            Pain Score      Experiencing any pain?:  Yes      Pain Scale Used:  Numerical      Pain Intensity:  4            Fatigue Score      Experiencing any fatigue?:  No      Fatigue (0-10 scale):  0 (none)            EXAM      General Appearance:  Positive for: Alert, Oriented x3, Cooperative      Eye:  Positive for: Anicteric Sclerae      Respiratory:  Positive for: CTAB      Abdomen/Gastro:  Positive for: Normal Active Bowel Sounds      Cardiovascular:  Positive for: RRR      Psychiatric:  Positive for: AAO X 3      Musculoskeletal:  Positive for: Full ROM Lower Extremety, Full ROM Upper     Extremety      Lower Extremities:  Negative for: Edema            PREVENTION      Hx Influenza Vaccination:  No      Influenza Vaccine Declined:  Yes (ALLERGIC)      2 or More Falls in Past Year?:  No      Fall Past Year with Injury?:  No      Hx Pneumococcal Vaccination:  Yes      Encouraged to follow-up with:  PCP regarding preventative exams. (WANTS COVID     VACCINE)      Chart initiated by      ERIK BAY MA            ALLERGY/MEDS      Allergies      Coded Allergies:              ATENOLOL (Verified  Allergy, Unknown, does not remember, 2/23/21)           CLONIDINE (Verified  Allergy, Unknown, does not remember , 2/23/21)           HYDRALAZINE (Verified  Allergy, Unknown, does not remember, 2/23/21)           LEVOFLOXACIN (Verified  Allergy, Unknown, does not remember, 2/23/21)           PRASUGREL (Verified  Allergy, Unknown, does not remember, 2/23/21)           RISPERIDONE (Verified  Allergy, Unknown, does not remember, 2/23/21)           GRASS POLLEN-PERENNIAL RYE, STANDAR (Verified  Adverse Reaction, Severe,     hives, 2/23/21)           CLOPIDOGREL (Verified  Adverse Reaction, Unknown, ITCHING , 2/23/21)           DOXYCYCLINE (Verified  Adverse Reaction, Unknown, diarrhea, 2/23/21)           PREDNISONE (Verified  Adverse Reaction, Unknown, diarrhea, 2/23/21)           TICAGRELOR (Verified  Adverse Reaction, Unknown, 2/23/21)      Uncoded Allergies:             feathers chicken (Allergy, Severe, hives, shortness of air. rash., 8/20/13)           flu shot (Allergy, Severe, shortness of air. hives rash., 8/20/13)           dust (Adverse Reaction, Severe, sneezes, 8/20/13)           leaves (Adverse Reaction, Severe, hives, 8/20/13)           mold (Adverse Reaction, Severe, hives, 8/20/13)            Medications      Last Reconciled on 1/20/21 16:09 by JOSE ALMEIDA      Aspirin Chew (Aspirin Baby) 81 Mg Tab.chew      81 MG PO QDAY, #30 TAB.CHEW 0 Refills         Reported         10/16/20       Losartan Potassium (Losartan*) 25 Mg Tablet      25 MG PO HS, #60 TAB 0 Refills         Reported         6/28/20       Montelukast Sodium (Montelukast*) 10 Mg Tablet      10 MG PO HS, TAB         Reported         6/28/20       amLODIPine (amLODIPine) 10 Mg Tablet      10 MG PO QAM, #30 TAB 0 Refills         Reported         6/28/20       Pravastatin Sodium (Pravastatin Sodium) 40 Mg Tablet      40 MG PO HS, #30 TAB 0 Refills         Reported         6/28/20       Metoprolol Succinate  (Metoprolol Succinate) 100 Mg Tab.er.24h      100 MG PO QDAY, #30 TAB         Reported         6/28/20       Furosemide (Furosemide) 20 Mg Tablet      20 MG PO QDAY, #30 TAB 0 Refills         Reported         6/28/20      Medications Reviewed:  No Changes made to meds            IMPRESSION/PLAN      Impression      Bladder cancer            Plan      Bladder cancer       Labs and clinical parameters ok for continued Tecentriq treatment       OV and f/u 3 weeks            Patient Education      Patient Education Provided:  Yes            Electronically signed by Tali Eubanks  02/23/2021 11:11       Disclaimer: Converted document may not contain table formatting or lab diagrams. Please see [facility] for the authenticated document.

## 2021-05-28 NOTE — PROGRESS NOTES
Patient: PROBUS,WILLIAM DENVER     Acct: [id number]     Report: #[id number]  UNIT #: [id number]     : 1940    Encounter Date:04/15/2021  PRIMARY CARE: MARIA ELENA MOORE  ***Signed***  --------------------------------------------------------------------------------------------------------------------  NURSE INTAKE      Visit Type      Established Patient Visit            Chief Complaint      BLADDER CA F/U            Referring Provider/Copies To      Referring Provider:  Antonino Dunn      Primary Care Provider:  MARIA ELENA MOORE      Copies To:   MARIA ELENA MOORE            History and Present Illness      Past Oncology Illness History      Patient returns to the clinic to review ct scans. Patient is a very active 81 yo    with biopsy-proven invasive, urothelial carcinoma in the left later wall of the     bladder performed on 20 by Dr. Dunn.  Patient reports that he had     bleeding in the urine with fei blood for 5 to 6 days.  He was seen twice in     the emergency room at his local hospital however they were unable to control the    symptoms.  For this reason he was referred to [facility] on     2020.  CT scan demonstrated a large clot/mass in the bladder.  He was seen     by urology and taken for cystoscopy with TURBT on 2020.  This revealed     muscle invasive urothelial carcinoma.              HPI - Oncology Interim      Patient returns for follow-up and ongoing treatment of his bladder cancer.  He     is on immunotherapy.  He declined chemotherapy, radiation.  He is tolerating the    infusions well.  Since his last appointment, he was hospitalized for symptomatic    carotid artery disease.  He was having TIA symptoms.  He was taken to the     operating room and underwent left carotid endarterectomy.  He states that he is     healing well from that surgery.  He feels much better having completed it.  He     does report occasional blood clots in his urine.  He denies pelvic  pain.  No new    masses or adenopathy.  He reports good appetite.            Cancer Details            20 biopsy-proven invasive, urothelial carcinoma in the left later wall      of the bladder performed by Dr. Dunn 10/23/2020 High grade urothelial      carcinoma invading into lamina propria and muscularis propria            Clinical Staging      T2, N0, MX            Treatments      Chemotherapy      Neoadjuvant Tecentriq      Surgeries      2020 TURBT  10/23/20 TURBT            Clinical Trial Participant      No            ECOG Performance Status      0            Most Recent Lab Findings      Laboratory Tests      21 13:20            Most Recent Imaging Findings      Patient: PROBUS,WILLIAM DENVER   Acct: #[id number]   Report: #[id number]            UNIT #: [id number]    DOS: 21 1242      INSURANCE:MEDICARE PART A   LOCATION:[facility] [place]     : 1940            PROVIDERS      ADMITTING:     ATTENDING: JOSE ALMEIDA      FAMILY:  Antonino Dunn   ORDERING:  JOSE ALMEIDA         OTHER:    DICTATING:  YUE GHOTRA MD            REQ #:[id number]   EXAM:ABDPELW - CT ABDOMEN  PELVIS w CONTRAST      REASON FOR EXAM:  BLADDER CA      REASON FOR VISIT:  BLADDER CA            *******Signed******         PROCEDURE:   CT ABDOMEN PELVIS WITH CONTRAST             COMPARISON:   [facility], CT, ABDOMEN/PELVIS WITH CONTRAST,     2020, 23:32.             INDICATIONS:   BLADDER CANCER             TECHNIQUE:   After obtaining the patient's consent, CT images were created with     non-ionic intravenous       contrast material.               PROTOCOL:     Standard imaging protocol performed                RADIATION:     DLP: 1890mGy*cm          Automated exposure control was utilized to minimize radiation dose.       CONTRAST:   95cc Isovue 370 I.V.      LABS:     eGFR: 62ml/min/1.73m2             FINDINGS:         Abdomen:  Included lung bases are predominately clear.             Liver,  spleen, adrenal glands, pancreas and gallbladder show no acute     abnormality.  There is a       small amount of sludge or tiny stones in the gallbladder no evidence for active     inflammation.        Uncomplicated colonic diverticulosis.  Moderate colonic stool burden.  Normal     appendix.  New       left-sided hydronephrosis and hydroureter.  The left ureter is dilated to the     level of an irregular       mass in the pelvis measuring 3.3 cm.  The mass is just superior to the bladder.             Pelvis:  Irregular, nodular bladder wall thickening, much greater on the left     than the right.        Overall appearance has increased since the previous study.  Left-sided bladder     wall thickening       measures up to 1.7 cm in thickness.  There is a 1.4 cm enhancing right external     iliac lymph node,       new compared to the prior.  No aggressive appearing bone change.             CONCLUSION:   Interval progression of nodular bladder wall thickening, in     keeping with provided       history of bladder cancer.             There is a new irregular nodule just superior to the bladder towards the left     that obstructs the       distal left ureter.  There is new left hydronephrosis.             Suspected new ralph metastasis in a right external iliac lymph node.              YUE GHOTRA MD             Electronically Signed and Approved By: YUE GHOTRA MD on 2021 at 15:22                               Until signed, this is an unconfirmed preliminary report that may contain      errors and is subject to change.                                              TRACY:      D:21 1522      Patient: PROBUS,WILLIAM DENVER   Acct: #[id number]   Report: #[id number]            UNIT #: [id number]    DOS: 21 1226      INSURANCE:MEDICARE PART A   LOCATION:[facility]  [id number]   : 1940            PROVIDERS      ADMITTING:  WESLEY MATHEWS   ATTENDING: WESLEY MATHEWS      FAMILY:  NONE,MD   ORDERING:   WESLEY MATHEWS         OTHER:    DICTATING:  Librado Cruz MD            REQ #:[id number]   EXAM:CHW - CT CHEST with CONTRAST      REASON FOR EXAM:  Rule out PE      REASON FOR VISIT:  HYPOX RESP FAILURE,COVID R/O,HF EXAC            *******Signed******         PROCEDURE:   CT CHEST W/ CONTRAST             COMPARISON:   Other, CT, CTA CHEST NONCORONARY, 3/27/2021, 3:40.             INDICATIONS:   SHORTNESS OF BREATH             TECHNIQUE:   After obtaining the patient's consent, CT images were obtained with    non-ionic       intravenous contrast material.               PROTOCOL:     Pulmonary embolism imaging protocol performed                RADIATION:     DLP: 1395 mGy*cm          Automated exposure control was utilized to minimize radiation dose.       CONTRAST:   75 cc Isovue 370 I.V.      LABS:     eGFR: >60 ml/min/1.73m2             FINDINGS:         There is no evidence of pulmonary embolism.             A borderline enlarged 1.0 cm subcarinal lymph node is noted.  Paraesophageal     adenopathy is noted       measuring up to 1.4 cm. Calcified mediastinal lymph nodes are consistent with     previous       granulomatous disease.  No hilar or axillary adenopathy is seen.  Small     bilateral pleural effusions       are noted.             Airspace opacities are noted in the mid and lower lung fields bilaterally.      There is mosaic       attenuation in the right lung consistent with small vessel or small airways     disease.             Sludge and/or small stones are noted in the gallbladder.  The visualized upper     abdomen otherwise       appears unremarkable.  There is an old right clavicular fracture.             CONCLUSION:         1. No evidence pulmonary embolism      2. Small bilateral pleural effusions      3. Paraesophageal adenopathy may be secondary to pneumonia or, less likely, n    eoplasm.      4. Airspace opacities in the mid and lower lung fields bilaterally may represent    pneumonia or        atelectasis.      5. Small airways versus small vessel disease in the right lung      6. Cholelithiasis              Librado Cruz M.D.             Electronically Signed and Approved By: Librado Cruz M.D. on 3/27/2021 at 16:49                                  Until signed, this is an unconfirmed preliminary report that may contain      errors and is subject to change.                                              WURRO:      D:21 1649            PAST, FAMILY   Past Medical History      Past Medical History:  Bleeding Condition, COPD, Diabetes Type 2, Heart Attack,     High Cholesterol, Hypertension, Stroke      Other PMH:        Asthma      CHF      Hematology/Oncology (M):  Bladder Cancer            Past Surgical History            5 vessel CABG       carotid endarterectomy      COLON RESECTION            Family History      Family History:  Lung Cancer            mother  at 81 with complications of broken hip from a fall      Father  at 78 with lung cancer      sister  during child birth in her 20's            Social History      Lives independently:  Yes      Number of Children:  2      Occupation:  retired      Other Social History:        retired from [employer] for 26 years      very active and volunteers            Tobacco Use      Tobacco status:  Former smoker            Substance Use      Substance use:  Denies use            Additional  Information      Additional History:        quit smoking in ; smoked 1 ppd      patient cares for his wife with dementia            REVIEW OF SYSTEMS      General:  Admits: Weight Loss;          Denies: Fatigue      ENT:  Admits Hearing Loss; Denies Sore Throat      Respiratory:  Admits: Shortness of Air      Genitourinary:  Denies Incontinence      Musculoskeletal:  Denies Back Pain, Denies Muscle Pain      Neurologic:  Denies Dizziness, Denies Numbness\Tingling            VITAL SIGNS AND SCORES      Vitals      Weight 213 lbs 10.012 oz / 96.9 kg       Temperature 97.6 F / 36.44 C - Temporal      Pulse 64      Respirations 18      Blood Pressure 138/52 Sitting, Left Arm      Pulse Oximetry 94%, rm air            Pain Score      Experiencing any pain?:  No      Pain Scale Used:  Numerical      Pain Intensity:  0            Fatigue Score      Fatigue (0-10 scale):  0 (none)            EXAM      General Appearance:  Positive for: Alert, Cooperative;          Negative for: Acute Distress      Eye:  Positive for: Anicteric Sclerae, Moist Conjunctiva      Neck:  Positive for: Supple;          Negative for: JVD, Masses      Other      Well-healed left carotid endarterectomy incision      Respiratory:  Positive for: CTAB, Normal Respiratory Effort      Abdomen/Gastro:  Positive for: Normal Active Bowel Sounds, Soft;          Negative for: Distention, Hepatosplenomegaly, Tenderness      Cardiovascular:  Positive for: RRR;          Negative for: Gallop, Murmur, Peripheral Edema, Rub      Psychiatric:  Positive for: Appropriate Affect, Intact Judgement      Lymphatic:  Negative for: Cervical, Infraclavicular, Supraclavicular            PREVENTION      Hx Influenza Vaccination:  No      Influenza Vaccine Declined:  Yes (ALLERGIC)      2 or More Falls in Past Year?:  No      Fall Past Year with Injury?:  No      Hx Pneumococcal Vaccination:  Yes      Encouraged to follow-up with:  PCP regarding preventative exams. (WANTS COVID     VACCINE)      Chart initiated by      CALIXTO PHILLIPS MA            ALLERGY/MEDS      Allergies      Coded Allergies:             CLOPIDOGREL (Verified  Allergy, Severe, ITCHING , 4/15/21)                  PT CAN TAKE BRAND NAME PLAVIX BUT NOT GENERIC           ATENOLOL (Verified  Allergy, Unknown, does not remember, 4/15/21)           CLONIDINE (Verified  Allergy, Unknown, does not remember , 4/15/21)           HYDRALAZINE (Verified  Allergy, Unknown, does not remember, 4/15/21)           LEVOFLOXACIN (Verified  Allergy, Unknown, does not remember,  4/15/21)           PRASUGREL (Verified  Allergy, Unknown, does not remember, 4/15/21)           RISPERIDONE (Verified  Allergy, Unknown, does not remember, 4/15/21)           GRASS POLLEN-PERENNIAL RYE, STANDAR (Verified  Adverse Reaction, Severe,     hives, 4/15/21)           DOXYCYCLINE (Verified  Adverse Reaction, Unknown, diarrhea, 4/15/21)           PREDNISONE (Verified  Adverse Reaction, Unknown, diarrhea, 4/15/21)           TICAGRELOR (Verified  Adverse Reaction, Unknown, 4/15/21)      Uncoded Allergies:             feathers chicken (Allergy, Severe, hives, shortness of air. rash., 8/20/13)           flu shot (Allergy, Severe, shortness of air. hives rash., 8/20/13)           dust (Adverse Reaction, Severe, sneezes, 8/20/13)           leaves (Adverse Reaction, Severe, hives, 8/20/13)           mold (Adverse Reaction, Severe, hives, 8/20/13)            Medications      Last Reconciled on 4/16/21 11:09 by NELSON CLIFFORD,       Potassium Chloride (K-Dur*) 10 Meq Tab.er.prt      10 MEQ PO QDAY, #30 TAB 0 Refills         Prov: WinteralaChasity         3/31/21       Metoprolol Succinate (Metoprolol Succinate) 25 Mg Tab.er.24h      75 MG PO QDAY for 30 Days, #90 TAB.ER         Prov: Chasity Miles         3/31/21       Furosemide (Furosemide) 20 Mg Tablet      60 MG PO QDAY for 30 Days, #90 TAB 0 Refills         Prov: BadshayalaAne         3/31/21       Insulin NPL/Insulin Lispro (HumaLOG Mix 75/25 KWIKPEN) 100 Unit/1 Ml Insuln.pen      20 UNITS SUBQ QDAY PRN for BLOOD SUGAR, #1 BOX 0 Refills         Reported         3/18/21       SITagliptin (Januvia) 50 Mg Tablet      50 MG PO C BK PRN for BLOOD SUGAR, TAB         Reported         3/18/21       Pravastatin Sodium (Pravastatin Sodium) 10 Mg Tablet      10 MG PO HS, TAB         Reported         3/18/21       MDI-Albuterol (Proventil HFA) 6.7 Gm Hfa.aer.ad      2 PUFFS INH Q4H PRN for SHORTNESS OF BREATH, #1 MDI 0 Refills         Reported         3/18/21        Aspirin Chew (Aspirin Baby) 81 Mg Tab.chew      81 MG PO QDAY, #30 TAB.CHEW 0 Refills         Reported         10/16/20       Losartan Potassium (Losartan*) 25 Mg Tablet      25 MG PO HS, #60 TAB 0 Refills         Reported         6/28/20       Montelukast Sodium (Montelukast*) 10 Mg Tablet      10 MG PO HS, TAB         Reported         6/28/20       amLODIPine (amLODIPine) 10 Mg Tablet      10 MG PO QAM, #30 TAB 0 Refills         Reported         6/28/20      Medications Reviewed:  No Changes made to meds            IMPRESSION/PLAN      Diagnosis      Bladder cancer         Malignant neoplasm of overlapping sites of bladder - C67.8         Bladder location: overlapping sites      Patient is on neoadjuvant immunotherapy with Tecentriq.  He is tolerating well.     Unfortunately his most recent scan suggest progression with increasing disease     burden in the bladder as well as new associated hydronephrosis.  There is an     additional lymph node in the pelvic region which has enlarged.  Although this     could represent pseudoprogression, is very concerning for true progression.      Tecentriq will be stopped.  I discussed the case with Dr. Dunn his urologist    who will see him in the near future for evaluation including cystoscopy.  He     reiterated that he is not interested in chemotherapy at all.  We discussed     possible radiation which she has refused in the past.  He is a little more open     to it at this point although still not enthusiastic.  Further recommendations     based upon urology evaluation.            Pain      Pain Zero Today            Advanced Care Plan Discussion      Declines Discussion [id number]            Patient Education      Patient Education Provided:  Yes            Electronically signed by JOSE ALMEIDA  04/17/2021 10:18       Disclaimer: Converted document may not contain table formatting or lab diagrams. Please see [employer] for the authenticated  document.

## 2021-06-07 NOTE — TELEPHONE ENCOUNTER
Received VM from patient stating that he was having some swelling in his ankles.    Returned call to patient. Patient stated that he had some lower extremity swelling last week but it has subsided. Stated that he needed a f/u appt with Dr. Hernandez after recent hospitalization for CHF/PNA in April. Scheduled on 6/29/2021.

## 2021-06-22 PROBLEM — I25.10 CORONARY ARTERY DISEASE INVOLVING NATIVE CORONARY ARTERY OF NATIVE HEART WITHOUT ANGINA PECTORIS: Status: ACTIVE | Noted: 2021-01-01

## 2021-06-23 NOTE — PROGRESS NOTES
Chief Complaint  Coronary Artery Disease and Hypertension    Subjective            William Denver Probus presents to [facility] CARDIOLOGY  History of Present Illness  Mr. Juarez presents for routine hospital follow-up today and states he is feeling much better since his discharge.  He was hospitalized with a severe case of pneumonia following carotid endarterectomy surgery earlier this year, but is now back to his normal physical activity baseline.  He does not report any episodes of chest pain/pressure or any palpitations, orthopnea, PND, peripheral edema, lightheadedness, or syncope.  His wife states that she has noticed his exercise tolerance being slightly reduced following his hospitalization a couple months ago.  Mr. Juarez reports some mild exertional dyspnea, but states this has been stable for several months.  He has a history of coronary artery disease status post 5 vessel CABG in 2012 and status post PCI to the native RCA in June 2020.  He also has a history of bladder cancer and recurrent problems with gross hematuria, so he is now only on antiplatelet monotherapy with aspirin.  His BP was well controlled at 130/56 today.  He is tolerating all his home medications well.      Past Medical History:   Diagnosis Date   • Allergic rhinitis    • Arthritis    • Asthma    • Asthma, intrinsic    • Bladder cancer (CMS/Formerly [facility])    • Bladder disorder    • CHF (congestive heart failure) (CMS/Formerly [facility])    • Coronary artery disease    • Diabetes (CMS/Formerly [facility])    • Gross hematuria    • HBP (high blood pressure)    • Heart disease    • High cholesterol    • History of stroke    • Kidney stones    • Limb pain    • Limb swelling    • Sleep apnea, obstructive        Past Surgical History:   • CARDIAC CATHETERIZATION   • CARDIAC SURGERY    CARDIAC STENTS   • CAROTID ENDARTERECTOMY   • CORONARY ARTERY BYPASS GRAFT   • CORONARY STENT PLACEMENT   • NECK SURGERY   • TONSILLECTOMY   • TRANSURETHRAL RESECTION OF BLADDER     TUMOR       Social History     Tobacco Use   • Smoking status: Former Smoker     Packs/day: 1.00     Years: 25.00     Pack years: 25.00     Types: Cigarettes     Start date:      Quit date:      Years since quittin.5   • Smokeless tobacco: Never Used   Vaping Use   • Vaping Use: Never used   Substance Use Topics   • Alcohol use: Yes     Alcohol/week: 1.0 standard drinks     Types: 1 Cans of beer per week   • Drug use: Never       Family History   Problem Relation Age of Onset   • Diabetes Mother    • Heart failure Father    • Lung cancer Brother         Current Outpatient Medications on File Prior to Visit   Medication Sig   • amLODIPine (NORVASC) 10 MG tablet amlodipine 10 mg oral tablet take 1 tablet (10 mg) by oral route once daily   Active   • aspirin (aspirin) 81 MG EC tablet aspirin 81 mg tablet,delayed release   Take 1 tablet every day by oral route.   • furosemide (LASIX) 20 MG tablet Take 20 mg by mouth Daily. 3 tabs po q am   • Insulin Lispro Prot & Lispro (HumaLOG Mix 75/25 KwikPen) (75-25) 100 UNIT/ML suspension pen-injector pen Sliding scale   • Januvia 50 MG tablet 50 mg Daily As Needed.   • losartan (COZAAR) 25 MG tablet Take 25 mg by mouth Daily.   • metoprolol succinate XL (TOPROL-XL) 25 MG 24 hr tablet Take 25 mg by mouth Daily. 3 tabs po qd   • montelukast (SINGULAIR) 10 MG tablet montelukast 10 mg tablet   Take 1 tablet every day by oral route.   • potassium chloride (K-DUR,KLOR-CON) 10 MEQ CR tablet Take 10 mEq by mouth Daily.   • pravastatin (PRAVACHOL) 40 MG tablet Take 40 mg by mouth Every Night.   • cyanocobalamin (VITAMIN B-12) 250 MCG tablet Take 250 mcg by mouth Daily.   • insulin lispro protamine-insulin lispro (humaLOG 75-25) (75-25) 100 UNIT/ML suspension injection Inject 50 Units under the skin into the appropriate area as directed.   • insulin lispro protamine-insulin lispro (HumaLOG Mix 50/50) (50-50) 100 UNIT/ML suspension injection    • nitroglycerin (Nitro-Bid) 2 %  "ointment Every 8 (Eight) Hours.     No current facility-administered medications on file prior to visit.       Allergies   Allergen Reactions   • Atenolol Unknown - High Severity   • Carvedilol Unknown - High Severity   • Clonidine Derivatives Unknown - High Severity   • Clopidogrel Unknown - High Severity   • Doxycycline Diarrhea   • Hydralazine Unknown - High Severity   • Levaquin [Levofloxacin] Unknown - High Severity   • Prasugrel Hcl Unknown - High Severity   • Prednisone Diarrhea   • Risperidone Unknown - High Severity       Review of Systems   Constitutional: Negative for fatigue, fever, unexpected weight gain and unexpected weight loss.   HENT: Negative for hearing loss, sinus pressure and sore throat.    Eyes: Negative for pain and visual disturbance.   Respiratory: Negative for cough, chest tightness and wheezing.    Cardiovascular: Negative for chest pain, palpitations and leg swelling.   Gastrointestinal: Negative for abdominal pain, nausea and vomiting.   Endocrine: Negative for cold intolerance, heat intolerance, polydipsia and polyuria.   Genitourinary: Negative for dysuria and hematuria.   Musculoskeletal: Positive for arthralgias. Negative for back pain.   Skin: Negative for color change, pallor and rash.   Neurological: Negative for syncope, weakness, light-headedness and headache.   Hematological: Negative for adenopathy. Does not bruise/bleed easily.   Psychiatric/Behavioral: Negative for decreased concentration and sleep disturbance.        Objective     /56   Pulse 70   Ht 182.9 cm (72\")   Wt 96.6 kg (213 lb)   BMI 28.89 kg/m²       Physical Exam  Constitutional:       General: He is not in acute distress.     Appearance: Normal appearance.   HENT:      Head: Atraumatic.      Mouth/Throat:      Mouth: Mucous membranes are moist.      Pharynx: Oropharynx is clear. No oropharyngeal exudate.   Eyes:      General: No scleral icterus.     Conjunctiva/sclera: Conjunctivae normal.   Neck:    "   Vascular: No carotid bruit or JVD.   Cardiovascular:      Rate and Rhythm: Normal rate and regular rhythm.      Chest Wall: PMI is not displaced.      Pulses: Normal pulses.           Radial pulses are 2+ on the right side and 2+ on the left side.      Heart sounds: S1 normal and S2 normal. No murmur heard.   No friction rub. No gallop.    Pulmonary:      Effort: Pulmonary effort is normal. No respiratory distress.      Breath sounds: Examination of the right-lower field reveals decreased breath sounds. Decreased breath sounds present. No wheezing, rhonchi or rales.   Chest:      Chest wall: No tenderness.   Abdominal:      General: Bowel sounds are normal. There is no distension.      Palpations: Abdomen is soft. There is no mass.      Tenderness: There is no abdominal tenderness.   Musculoskeletal:         General: No swelling, tenderness or deformity.      Cervical back: Neck supple. No tenderness.      Right lower leg: No edema.      Left lower leg: No edema.   Skin:     General: Skin is warm and dry.      Coloration: Skin is not jaundiced.      Findings: No erythema or rash.      Nails: There is no clubbing.   Neurological:      General: No focal deficit present.      Mental Status: He is alert and oriented to person, place, and time.      Motor: No weakness.   Psychiatric:         Mood and Affect: Mood normal.         Behavior: Behavior normal.       Result Review :     The following data was reviewed by: Gaurav Hernandez MD on 06/22/2021:    CMP    CMP 3/30/21 3/31/21 4/5/21   Glucose 195 (A) 194 (A) 159 (A)   BUN 22 13 20   Creatinine 1.19 0.99 1.18   Sodium 134 (A) 137 133 (A)   Potassium 4.1 4.1 4.5   Chloride 99 101 94 (A)   Calcium 8.7 8.8 9.7   Albumin   4.0   Total Bilirubin   0.33   Alkaline Phosphatase   94   AST (SGOT)   18   ALT (SGPT)   13   (A) Abnormal value            CBC    CBC 3/30/21 3/31/21 4/5/21   WBC 7.46 7.25 8.54   RBC 3.89 (A) 3.89 (A) 4.68 (A)   Hemoglobin 11.1 (A) 11.1 (A) 13.4  (A)   Hematocrit 35.2 (A) 34.7 (A) 42.3   MCV 90.5 89.2 90.4   MCH 28.5 28.5 28.6   MCHC 31.5 (A) 32.0 (A) 31.7 (A)   RDW 14.6 (A) 14.4 14.5 (A)   Platelets 199 209 269   (A) Abnormal value       Comments are available for some flowsheets but are not being displayed.           Imaging studies reviewed:  Echocardiogram 3/27/21:  CONCLUSIONS:    1.  Mildly reduced left ventricular function, ejection fraction 45-50%.    2.  Indeterminate diastolic function.    3.  Evidence of mild tricuspid regurgitation with moderate pulmonary    hypertension.    4.  Mild left atrial enlargement.           Assessment and Plan      Diagnoses and all orders for this visit:    1. Coronary artery disease involving native coronary artery of native heart without angina pectoris (Primary)    2. S/P CABG x 5    3. S/P coronary artery stent placement    4. Mixed hyperlipidemia    5. Essential hypertension    -CAD s/p 5 vessel CABG in 2012 and PCI to native RCA in June 2020:  Stable, no anginal symptoms or other problems reported today.  Continue current medical therapy with aspirin, pravastatin (he previously failed to tolerate higher intensity statin therapy), Toprol XL and amlodipine.  Last LHC in June 2020 showed a widely patent sequential SVG to the LAD and diagonal and a widely patent sequential SVG to the OM1 and OM2.  His SVG-RCA was chronically occluded and he underwent successful PCI to the native proximal-mid RCA using 2 Xience Shauna drug-eluting stents.  He is now off Plavix due to recurrent hematuria on dual antiplatelet therapy (he has a history of bladder cancer and has experienced problems with hematuria for several years).      -Hyperlipidemia:  LDL goal <70, lipid profile followed by his PCP.  Continue pravastatin at current dose, as he previously failed to tolerate higher intensity statin therapy and declines consideration of ezetimibe or a PCSK9 inhibitor.    -Hypertension:  BP remains well controlled on current medication;  continue same.    I spent 36 minutes caring for Casey on this date of service. This time includes time spent by me in the following activities:preparing for the visit, reviewing tests, performing a medically appropriate examination and/or evaluation , counseling and educating the patient/family/caregiver and documenting information in the medical record    Follow Up     Return in about 6 months (around 12/22/2021) for Next scheduled follow up.    Patient was given instructions and counseling regarding his condition or for health maintenance advice. Please see specific information pulled into the AVS if appropriate.     William Denver Probus  reports that he quit smoking about 40 years ago. His smoking use included cigarettes. He started smoking about 71 years ago. He has a 25.00 pack-year smoking history. He has never used smokeless tobacco.. I have educated him on the risk of diseases from using tobacco products such as cancer, COPD and heart disease.  Continued tobacco cessation was encouraged.

## 2021-07-28 NOTE — PROGRESS NOTES
[facility]   Follow up Office    Patient Name: William Denver Probus  : 1940  MRN: [id number]  Primary Care Physician:  Agnes Fair APRN      Subjective   Subjective     HPI:    William Denver Probus is a 80 y.o. male here for routine follow-up after left carotid endarterectomy 3/23/2021.  Doing well.  No complaints.      Objective     Vitals:   Temp:  [97.4 °F (36.3 °C)] 97.4 °F (36.3 °C)  Heart Rate:  [77] 77  Resp:  [18] 18  BP: (139-141)/(72) 141/72    Physical Exam      General: Alert, no acute distress.  Neck: Healing well, no signs of infection.  Extremities: Symmetric.  Neuro: Intact    Diagnostic studies: A carotid duplex in the office today demonstrates satisfactory results with no significant bilateral carotid stenosis.    Assessment/Plan   Assessment / Plan     Diagnoses and all orders for this visit:    1. Bilateral carotid artery stenosis (Primary)  -     Duplex Carotid Ultrasound CAR; Future       Assessment/Plan:   Mr. Juarez returns to the office in routine follow-up after his left carotid endarterectomy.  He is doing very well.  His carotid duplex demonstrates no evidence of restenosis.  At this time the plan is for a follow-up carotid duplex in 1 year.        Electronically signed by Breezy Romo MD, 21, 2:37 PM EDT.

## 2021-10-21 PROBLEM — C67.9 MALIGNANT NEOPLASM OF URINARY BLADDER (HCC): Status: ACTIVE | Noted: 2021-01-01

## 2021-10-21 NOTE — PROGRESS NOTES
Chief Complaint    Urologic complaint    Subjective          William Denver Probus presents to [facility] UROLOGY  History of Present Illness     80-year-old  gentleman with a history of nephrolithiasis also with T2 TCC.      Patient's been having some pain in his pelvic region.  It can be severe at times and comes not too bad to use he does have a dull aching type pain in his pelvis at night that is causing him some trouble sleeping.    He has not had a lot of gross hematuria but did passed 2 large clots earlier this week.    He is having some aching in his back.      No burning or dysuria.  Voiding okay.  Not straining.  Patient is having a lot of frequency.    Patient is taken Tylenol and ibuprofen.  Tylenol does help.    No UTIs    UA today shows small blood and large leukocytes.    Off  Anticoagulation    Patient has started immunotherapy with Dr. Covington.  He has had 2 treatments.    Resected over the patient's left ureteral orifice at his last surgery so he comes in today with renal ultrasound    2/21 renal ultrasound -mild to moderate hydronephrosis on the left, ill-defined mass in the posterior urinary bladder.    Previous    2012 CABG.  Cornary stent placed in 6/20.  Asthma.  Former smoke, positive for diabetes, history of CVA    6/20 creatinine 1.0, GFR 63    9/20 urethral dilation before catheter to be placed    TCC    3/21 patient had immunotherapy with a medical oncology, was not able to finish all the treatments as I did not think it was working in insurance not covering.    10/22/2020 TURBT - resected all tumor I could safely.  Bladder was very large and I could not reach the top part of the tumor.  Did resect over the left ureteral orifice but we had good efflux of indigo carmine.  PathologyT2 high-grade TCC    4/6/2020 TURBT/clot evacuationone third of his bladder is full of clots, large sessile bladder tumor left lateral wall.  I resected everything that looked abnormal  but there is still some erythema surrounding this area.  path -TCC, invasive, high-grade, muscularis propria present and involved, T2    4/5/2020 CT abdomen/pelvis with - mass involving the urinary bladder.  No definite enlarged pelvic lymphadenopathy.  No hydronephrosis    4/5/2020 creatinine 0.98, GFR > 60    PVR     3/20  23     No prostate meds.  on a diuretic.    Patient has had greater than in kidney stones, he has never had lithotripsy    No  family history.     Results for orders placed or performed in visit on 10/22/21   POC Urinalysis Dipstick, Automated    Specimen: Urine   Result Value Ref Range    Color Yellow Yellow, Straw, Dark Yellow, [place]    Clarity, UA Clear Clear    Specific Gravity  1.020 1.005 - 1.030    pH, Urine 6.5 5.0 - 8.0    Leukocytes Large (3+) (A) Negative    Nitrite, UA Negative Negative    Protein, POC 30 mg/dL (A) Negative mg/dL    Glucose,  mg/dL (A) Negative, 1000 mg/dL (3+) mg/dL    Ketones, UA Negative Negative    Urobilinogen, UA Normal Normal    Bilirubin Negative Negative    Blood, UA Small (A) Negative    Lot Number [id number]     Expiration Date 822          Past History:  Medical History: has a past medical history of Allergic rhinitis, Arthritis, Asthma, Asthma, intrinsic, Bladder cancer (CMS/Former[facility]), Bladder disorder, CHF (congestive heart failure) (CMS/Formerly [facility]), Coronary artery disease, Diabetes (CMS/Formerly [facility]), Gross hematuria, HBP (high blood pressure), Heart disease, High cholesterol, History of stroke, Kidney stones, Limb pain, Limb swelling, and Sleep apnea, obstructive.   Surgical History: has a past surgical history that includes Neck surgery (Left); Tonsillectomy; Cardiac surgery; Transurethral resection of bladder; Cardiac catheterization; Coronary artery bypass graft; Coronary stent placement; and Carotid endarterectomy.   Family History: family history includes Diabetes in his mother; Heart failure in his father; Lung cancer in his brother.   Social History: reports  that he quit smoking about 40 years ago. His smoking use included cigarettes. He started smoking about 71 years ago. He has a 25.00 pack-year smoking history. He has never used smokeless tobacco. He reports current alcohol use of about 1.0 standard drink of alcohol per week. He reports that he does not use drugs.  Allergies: Atenolol, Carvedilol, Clonidine derivatives, Clopidogrel, Doxycycline, Hydralazine, Levaquin [levofloxacin], Prasugrel hcl, Prednisone, and Risperidone       Current Outpatient Medications:   •  amLODIPine (NORVASC) 10 MG tablet, TAKE 1 TABLET BY MOUTH EVERY DAY, Disp: 60 tablet, Rfl: 1  •  aspirin (aspirin) 81 MG EC tablet, aspirin 81 mg tablet,delayed release  Take 1 tablet every day by oral route., Disp: , Rfl:   •  cyanocobalamin (VITAMIN B-12) 250 MCG tablet, Take 250 mcg by mouth Daily., Disp: , Rfl:   •  furosemide (LASIX) 20 MG tablet, Take 20 mg by mouth Daily. 3 tabs po q am, Disp: , Rfl:   •  Insulin Lispro Prot & Lispro (HumaLOG Mix 75/25 KwikPen) (75-25) 100 UNIT/ML suspension pen-injector pen, Sliding scale, Disp: , Rfl:   •  insulin lispro protamine-insulin lispro (humaLOG 75-25) (75-25) 100 UNIT/ML suspension injection, Inject 50 Units under the skin into the appropriate area as directed., Disp: , Rfl:   •  insulin lispro protamine-insulin lispro (HumaLOG Mix 50/50) (50-50) 100 UNIT/ML suspension injection, , Disp: , Rfl:   •  Januvia 50 MG tablet, 50 mg Daily As Needed., Disp: , Rfl:   •  losartan (COZAAR) 25 MG tablet, Take 25 mg by mouth Daily., Disp: , Rfl:   •  metoprolol succinate XL (TOPROL-XL) 25 MG 24 hr tablet, Take 25 mg by mouth Daily. 3 tabs po qd, Disp: , Rfl:   •  montelukast (SINGULAIR) 10 MG tablet, montelukast 10 mg tablet  Take 1 tablet every day by oral route., Disp: , Rfl:   •  nitroglycerin (Nitro-Bid) 2 % ointment, Every 8 (Eight) Hours., Disp: , Rfl:   •  potassium chloride (K-DUR,KLOR-CON) 10 MEQ CR tablet, Take 10 mEq by mouth Daily., Disp: , Rfl:   •   pravastatin (PRAVACHOL) 40 MG tablet, Take 40 mg by mouth Every Night., Disp: , Rfl:      Physical exam       Alert and orient x3  Well appearing, well developed, in no acute distress   Unlabored respirations  Nontender/nondistended      Grossly oriented to person, place and time, judgment is intact, normal mood and affect    Results for orders placed or performed during the hospital encounter of 07/28/21   Duplex Carotid Ultrasound CAR   Result Value Ref Range    Target HR (85%) 117 bpm    Max. Pred. HR (100%) 138 bpm    XLRA ZEV LEFT CAROTID BULB PSV 78.00 cm/sec    Prox CCA PSV 98 cm/sec    Prox CCA EDV 12 cm/sec    Dist CCA PSV 81 cm/sec    Dist CCA EDV 14 cm/sec    Prox ECA  cm/sec    Prox ECA EDV 13 cm/sec    Prox ICA PSV 35 cm/sec    Prox ICA EDV 9 cm/sec    Mid ICA PSV 65 cm/sec    Mid ICA EDV 14 cm/sec    Dist ICA PSV 76 cm/sec    Dist ICA EDV 14 cm/sec    Vertebral A PSV 48 cm/sec    Vertebral A EDV 12 cm/sec    Prox CCA  cm/sec    Prox CCA EDV 9 cm/sec    Dist CCA  cm/sec    Dist CCA EDV 13 cm/sec    Prox ECA  cm/sec    Prox ECA EDV 11 cm/sec    Prox ICA  cm/sec    Prox ICA EDV 17 cm/sec    Mid ICA  cm/sec    Mid ICA EDV 17 cm/sec    Dist ICA  cm/sec    Dist ICA EDV 24 cm/sec    Vertebral A PSV 39 cm/sec    Vertebral A EDV 9 cm/sec    Left arm /72 mmHg    Right arm /72 mmHg    XLRA ZEV LEFT CAROTID BULB EDV 11.00 cm/sec    XLRA ZEV RIGHT CAROTID BULB PSV 47.00 cm/sec    XLRA ZEV RIGHT CAROTID BULB EDV 9.00 cm/sec        Objective     Vital Signs:   There were no vitals taken for this visit.             Assessment and Plan    Diagnoses and all orders for this visit:    1. Malignant neoplasm of urinary bladder, unspecified site (HCC) (Primary)          T2 TCC    Patient has declined cystectomy and radiation      patient declined cystoscopy today.  I will go ahead and get a renal ultrasound and a BMP and see him back in a few weeks to  discuss    At his last TURBT had a hard time stopping the bleeding I do not want to go back in unless patient is having problems.  He is doing well currently.  Patient voiced understanding

## 2021-10-28 NOTE — PROGRESS NOTES
Chief Complaint    Urologic complaint    Subjective          William Denver Probus presents to [facility] UROLOGY  History of Present Illness     80-year-old  gentleman with a history of nephrolithiasis also with T2 TCC.      Pt went to the ER yesterday for GH and diahrrhea.Gh started last Monday.  A little better today.  He has been passing quite a few clots.  Not straining to void.  Has stopped his aspirin.    Urine ctx yesterday contaminated, some mild dysuria with voiding.    No fever/chills.    10/27/2021 creatinine 1.8, GFR 36, glucose 244  10/27/2021 renal ultrasound-small benign cysts right, moderate to severe abnormal dilation of the left collecting system.  Unchanged significantly from previous study.  Small focal mass in the wall urinary bladder may be smaller since previous study.    Some dyuria and pain with voiding.    Undergoing no treatment for his bladder cancer    Previous    Patient's been having some pain in his pelvic region.  It can be severe at times and comes not too bad to use he does have a dull aching type pain in his pelvis at night that is causing him some trouble sleeping.    Patient is taken Tylenol and ibuprofen.  Tylenol does help.    Off  Anticoagulation    Resected over the patient's left ureteral orifice at his last surgery so he comes in today with renal ultrasound    2/21 renal ultrasound -mild to moderate hydronephrosis on the left, ill-defined mass in the posterior urinary bladder.    Previous    2012 CABG.  Cornary stent placed in 6/20.  Asthma.  Former smoke, positive for diabetes, history of CVA    6/20 creatinine 1.0, GFR 63    9/20 urethral dilation before catheter to be placed    TCC    3/21 patient had immunotherapy with a medical oncology, was not able to finish all the treatments as I did not think it was working in insurance not covering.    10/22/2020 TURBT - resected all tumor I could safely.  Bladder was very large and I could not reach  the top part of the tumor.  Did resect over the left ureteral orifice but we had good efflux of indigo carmine.  PathologyT2 high-grade TCC    4/6/2020 TURBT/clot evacuationone third of his bladder is full of clots, large sessile bladder tumor left lateral wall.  I resected everything that looked abnormal but there is still some erythema surrounding this area.  path -TCC, invasive, high-grade, muscularis propria present and involved, T2    4/5/2020 CT abdomen/pelvis with - mass involving the urinary bladder.  No definite enlarged pelvic lymphadenopathy.  No hydronephrosis    4/5/2020 creatinine 0.98, GFR > 60    PVR     3/20  23     No prostate meds.  on a diuretic.    Patient has had greater than in kidney stones, he has never had lithotripsy    No  family history.     Results for orders placed or performed in visit on 10/27/21   Basic Metabolic Panel    Specimen: Blood   Result Value Ref Range    Glucose 244 (H) 65 - 99 mg/dL    BUN 36 (H) 8 - 23 mg/dL    Creatinine 1.81 (H) 0.76 - 1.27 mg/dL    Sodium 135 (L) 136 - 145 mmol/L    Potassium 4.7 3.5 - 5.2 mmol/L    Chloride 97 (L) 98 - 107 mmol/L    CO2 26.0 22.0 - 29.0 mmol/L    Calcium 9.3 8.6 - 10.5 mg/dL    eGFR Non African Amer 36 (L) >60 mL/min/1.73    BUN/Creatinine Ratio 19.9 7.0 - 25.0    Anion Gap 12.0 5.0 - 15.0 mmol/L         Past History:  Medical History: has a past medical history of Allergic rhinitis, Arthritis, Asthma, Asthma, intrinsic, Bladder cancer (HCC), Bladder disorder, CHF (congestive heart failure) (HCC), Coronary artery disease, Diabetes (HCC), Gross hematuria, HBP (high blood pressure), Heart disease, High cholesterol, History of stroke, Kidney stones, Limb pain, Limb swelling, and Sleep apnea, obstructive.   Surgical History: has a past surgical history that includes Neck surgery (Left); Tonsillectomy; Cardiac surgery; Transurethral resection of bladder; Cardiac catheterization; Coronary artery bypass graft; Coronary stent placement;  and Carotid endarterectomy.   Family History: family history includes Diabetes in his mother; Heart failure in his father; Lung cancer in his brother.   Social History: reports that he quit smoking about 40 years ago. His smoking use included cigarettes. He started smoking about 71 years ago. He has a 25.00 pack-year smoking history. He has never used smokeless tobacco. He reports current alcohol use of about 1.0 standard drink of alcohol per week. He reports that he does not use drugs.  Allergies: Atenolol, Carvedilol, Clonidine derivatives, Clopidogrel, Doxycycline, Hydralazine, Levaquin [levofloxacin], Prasugrel hcl, Prednisone, and Risperidone     No current facility-administered medications for this visit.    Current Outpatient Medications:   •  amLODIPine (NORVASC) 10 MG tablet, TAKE 1 TABLET BY MOUTH EVERY DAY, Disp: 60 tablet, Rfl: 1  •  aspirin (aspirin) 81 MG EC tablet, aspirin 81 mg tablet,delayed release  Take 1 tablet every day by oral route., Disp: , Rfl:   •  cyanocobalamin (VITAMIN B-12) 250 MCG tablet, Take 250 mcg by mouth Daily., Disp: , Rfl:   •  furosemide (LASIX) 20 MG tablet, Take 20 mg by mouth Daily. 3 tabs po q am, Disp: , Rfl:   •  Insulin Lispro Prot & Lispro (HumaLOG Mix 75/25 KwikPen) (75-25) 100 UNIT/ML suspension pen-injector pen, Sliding scale, Disp: , Rfl:   •  insulin lispro protamine-insulin lispro (humaLOG 75-25) (75-25) 100 UNIT/ML suspension injection, Inject 50 Units under the skin into the appropriate area as directed., Disp: , Rfl:   •  insulin lispro protamine-insulin lispro (HumaLOG Mix 50/50) (50-50) 100 UNIT/ML suspension injection, , Disp: , Rfl:   •  Januvia 50 MG tablet, 50 mg Daily As Needed., Disp: , Rfl:   •  losartan (COZAAR) 25 MG tablet, Take 25 mg by mouth Daily., Disp: , Rfl:   •  metoprolol succinate XL (TOPROL-XL) 25 MG 24 hr tablet, Take 25 mg by mouth Daily. 3 tabs po qd, Disp: , Rfl:   •  montelukast (SINGULAIR) 10 MG tablet, montelukast 10 mg tablet   Take 1 tablet every day by oral route., Disp: , Rfl:   •  nitroglycerin (Nitro-Bid) 2 % ointment, Every 8 (Eight) Hours., Disp: , Rfl:   •  potassium chloride (K-DUR,KLOR-CON) 10 MEQ CR tablet, Take 10 mEq by mouth Daily., Disp: , Rfl:   •  pravastatin (PRAVACHOL) 40 MG tablet, Take 40 mg by mouth Every Night., Disp: , Rfl:     Facility-Administered Medications Ordered in Other Visits:   •  sodium chloride 0.9 % flush 10 mL, 10 mL, Intravenous, PRN, Juan Luis Christianson MD     Physical exam       Alert and orient x3  Well appearing, well developed, in no acute distress   Unlabored respirations    Grossly oriented to person, place and time, judgment is intact, normal mood and affect    Results for orders placed or performed in visit on 10/27/21   Basic Metabolic Panel    Specimen: Blood   Result Value Ref Range    Glucose 244 (H) 65 - 99 mg/dL    BUN 36 (H) 8 - 23 mg/dL    Creatinine 1.81 (H) 0.76 - 1.27 mg/dL    Sodium 135 (L) 136 - 145 mmol/L    Potassium 4.7 3.5 - 5.2 mmol/L    Chloride 97 (L) 98 - 107 mmol/L    CO2 26.0 22.0 - 29.0 mmol/L    Calcium 9.3 8.6 - 10.5 mg/dL    eGFR Non African Amer 36 (L) >60 mL/min/1.73    BUN/Creatinine Ratio 19.9 7.0 - 25.0    Anion Gap 12.0 5.0 - 15.0 mmol/L        Objective     Vital Signs:   There were no vitals taken for this visit.             Assessment and Plan    Diagnoses and all orders for this visit:    1. Malignant neoplasm of urinary bladder, unspecified site (HCC) (Primary)          T2 TCC/gross hematuria    Patient has declined cystectomy and radiation.     Urine culture sent today    Discussed with the patient he should drink plenty of fluids and hopefully gross hematuria will subside on stone.  I did discuss if he gets we cannot void he will have to going to the emergency room.    I did discuss things continue to get worse he can let me know and we could consider another resection to see if we get the bleeding under better control.  I did discuss there is always a  risk that bleeding can be made worse or he could have a perioperative complication.  Patient voiced understanding    At this time he will follow-up as needed     patient declined cystoscopy today.  I will go ahead and get a renal ultrasound and a BMP and see him back in a few weeks to discuss

## 2021-10-30 PROBLEM — R31.0 GROSS HEMATURIA: Status: ACTIVE | Noted: 2021-01-01

## 2021-10-30 NOTE — ANESTHESIA POSTPROCEDURE EVALUATION
Patient: William Denver Probus    Procedure Summary     Date: 10/30/21 Room / Location: [facility] OR 06 / [facility] MAIN OR    Anesthesia Start: 1412 Anesthesia Stop: 1610    Procedure: CYSTOSCOPY, CLOT EVACUATION, FULGURATION OF BLEEDING, TRANSURETHRAL RESECTION OF BLADDER TUMOR (N/A Urethra) Diagnosis:       Gross hematuria      (Gross hematuria [R31.0])    Surgeons: Antonino Dunn MD Provider: Casey Harvey MD    Anesthesia Type: general ASA Status: 4 - Emergent          Anesthesia Type: general    Vitals  Vitals Value Taken Time   /69 10/30/21 1619   Temp 36.3 °C (97.4 °F) 10/30/21 1612   Pulse 72 10/30/21 1622   Resp 20 10/30/21 1612   SpO2 96 % 10/30/21 1622   Vitals shown include unvalidated device data.        Post Anesthesia Care and Evaluation    Patient location during evaluation: bedside  Patient participation: complete - patient participated  Level of consciousness: awake  Pain management: adequate  Airway patency: patent  Anesthetic complications: No anesthetic complications  PONV Status: none  Cardiovascular status: acceptable  Respiratory status: acceptable  Hydration status: acceptable    Comments: An Anesthesiologist personally participated in the most demanding procedures (including induction and emergence if applicable) in the anesthesia plan, monitored the course of anesthesia administration at frequent intervals and remained physically present and available for immediate diagnosis and treatment of emergencies.

## 2021-10-30 NOTE — ANESTHESIA PREPROCEDURE EVALUATION
Anesthesia Evaluation     Patient summary reviewed and Nursing notes reviewed                Airway   Mallampati: III  TM distance: >3 FB  Neck ROM: full  No difficulty expected  Dental    (+) lower dentures and upper dentures    Pulmonary - normal exam    breath sounds clear to auscultation  (+) asthma,sleep apnea,   Cardiovascular - normal exam    Rhythm: regular    (+) hypertension, CAD, CABG, CHF , hyperlipidemia,       Neuro/Psych- negative ROS  GI/Hepatic/Renal/Endo    (+)   renal disease, diabetes mellitus,     Musculoskeletal     Abdominal    Substance History - negative use     OB/GYN negative ob/gyn ROS         Other   arthritis,    history of cancer                    Anesthesia Plan    ASA 4 - emergent     general     intravenous induction     Anesthetic plan, all risks, benefits, and alternatives have been provided, discussed and informed consent has been obtained with: patient.

## 2021-11-01 NOTE — OUTREACH NOTE
Prep Survey      Responses   [facility] patient discharged from? [facility]   Is LACE score < 7 ? Yes   Emergency Room discharge w/ pulse ox? No   Eligibility Not Eligible   What are the reasons patient is not eligible? Other   Does the patient have one of the following disease processes/diagnoses(primary or secondary)? Other   Prep survey completed? Yes          Arlin Lindquist RN

## 2021-11-02 NOTE — TELEPHONE ENCOUNTER
Pt had surgery on 10/30/21 and had a catheter inserted. He said he was told to take it out five days after the surgery but wants to know if he can take it out today. He said the urine is clear and there is no blood visible at all. He really wants it out and wanted to see what you thought.     [phone]

## 2022-06-20 ENCOUNTER — TELEPHONE (OUTPATIENT)
Dept: VASCULAR SURGERY | Facility: HOSPITAL | Age: 82
End: 2022-06-20

## 2022-06-20 ENCOUNTER — TRANSCRIBE ORDERS (OUTPATIENT)
Dept: VASCULAR SURGERY | Facility: HOSPITAL | Age: 82
End: 2022-06-20

## 2022-06-20 DIAGNOSIS — I65.23 BILATERAL CAROTID ARTERY STENOSIS: Primary | ICD-10-CM

## 2022-06-24 ENCOUNTER — TELEPHONE (OUTPATIENT)
Dept: VASCULAR SURGERY | Facility: HOSPITAL | Age: 82
End: 2022-06-24

## 2022-06-24 NOTE — TELEPHONE ENCOUNTER
“Please be informed that patient has passed. Patient has been marked  in the system. The date of death is: 22    Caller: Rashid Juarez    Relationship: Emergency Contact    Best call back number: [phone]

## 2023-11-22 NOTE — PROGRESS NOTES
"   Progress Note      Patient Name: Casey Juarez   Patient ID: [id number]   Sex: Male   YOB: 1940    Primary Care Provider: Agnes MARR   Referring Provider: Agnes MARR    Visit Date: June 30, 2020    Provider: Antonino Dunn MD   Location[facility]   Location Address: [address] [place] [address]  [id number]   Location Phone: [phone]          Chief Complaint  · pt here for urologic issues      History Of Present Illness     60    PVR     3/20  23     No prostate meds.  on a diuretic.    Patient has had greater than in kidney stones, he has never had lithotripsy    No  family history.            \">79-year-old  gentleman with a history of nephrolithiasis today status post recent emergent TURBT secondary to clot retention and a bladder tumor , T2    Just recently discharged secondary to cardiac issues.  Patient a coronary stent placed yesterday  Cardiologist Dr. Hernandez    Voiding without issue.  Does have some gross hematuria.    2012 CABG.  Asthma.  Former smoke.  Patient was placed on Plavix and aspirin 81 after his stent, positive for diabetes, history of CVA    6/20 creatinine 1.0, GFR 63    Previous    4/6/2020 TURBT/clot evacuationone third of his bladder is full of clots, large sessile bladder tumor left lateral wall.  I resected everything that looked abnormal but there is still some erythema surrounding this area.  path -TCC, invasive, high-grade, muscularis propria present and involved, T2    4/5/2020 CT abdomen/pelvis withmass involving the urinary bladder.  No definite enlarged pelvic lymphadenopathy.  No hydronephrosis    4/5/2020 creatinine 0.98, GFR > 60    PVR     3/20  23     No prostate meds.  on a diuretic.    Patient has had greater than in kidney stones, he has never had lithotripsy    No  family history.                   Past Medical History  Allergic rhinitis, chronic; Arthritis; Asthma; Bladder Disorder; Congestive heart " failure; Diabetes; Heart Disease; High blood pressure; High cholesterol; History of stroke; Kidney stones; Limb Pain; Limb Swelling; Stroke         Past Surgical History  cardiac stents; Open Heart Surgery; Tonsillectomy         Medication List  amlodipine 10 mg oral tablet; Aspir-81 81 mg oral tablet,delayed release (DR/EC); Cipro 500 mg oral tablet; furosemide 20 mg oral tablet; Humalog Mix 50-50 Insuln U-100 100 unit/mL (50-50) subcutaneous suspension; losartan 25 mg oral tablet; metoprolol succinate 100 mg oral tablet extended release 24 hr; Plavix 75 mg oral tablet; pravastatin 40 mg oral tablet         Allergy List  atenolol; carvedilol; clonidine; doxycycline hyclate; hydralazine; prednisone; Risperdal         Family Medical History  -Father's Family History Unknown; -Mother's Family History Unknown         Social History  Tobacco (Former)         Review of Systems  · Constitutional  o Denies  o : chills  · Respiratory  o Denies  o : cough      Vitals  Date Time BP Position Site L\R Cuff Size HR RR TEMP (F) WT  HT  BMI kg/m2 BSA m2 O2 Sat        06/30/2020 10:17 AM       17  235lbs 0oz 6'   31.87 2.33           Physical Examination  · Constitutional  o Appearance  o : Well-appearing, well-developed, in no acute distress  · Respiratory  o Respiratory Effort  o : Unlabored breathing  · Gastrointestinal  o Abdominal Examination  o : Nontender, nondistended, no rigidity or guarding, no hepatosplenomegaly  · Neurologic  o Mental Status Examination  o :   § Orientation  § : Grossly oriented to person, place and time, judgment and insight intact, normal mood and affect              Assessment  · Gross hematuria     599.71/R31.0  · History of nephrolithiasis     V13.01/Z87.442  · Malignant neoplasm of lateral wall of urinary bladder     188.2/C67.2    Problems Reconciled  Plan  · Medications  o Medications have been Reconciled  o Transition of Care or Provider Policy  · Instructions  o Electronically Identified  Patient Education Materials Provided Electronically       Discussed again with the patient that failure to do any treatment for his T2 bladder cancer could result in death in 6 months to 2 years.  We did discuss complications and risk including hematuria, clot retention, renal failure or any other multitude of things that can happen with metastatic bladder cancer.  Patient voiced understanding.  Patient at this time is going to let the disease take its natural course he does not want any treatment.  He has seen radiation oncology and medical oncology has refused any further treatment.    Patient cannot come off Plavix for at least 6 months secondary to his recently placed coronary stent.  I will see him back in 6 months that he is doing, he will come in sooner if he is having problems.    Greater than 20 minutes was used in counseling and coordination of care, with greater than 51% of this in face-to-face counseling             Electronically Signed by: Antonino Dunn MD -Author on June 30, 2020 10:41:17 AM   No. KRISTIE screening performed.  STOP BANG Legend: 0-2 = LOW Risk; 3-4 = INTERMEDIATE Risk; 5-8 = HIGH Risk

## (undated) DEVICE — TRAY,ADD A CATH,FOLEY,DRAIN BG,10ML SYR: Brand: MEDLINE

## (undated) DEVICE — TRAY,IRRIGATION,BULBSYRINGE,60ML,CSR,PVP: Brand: MEDLINE

## (undated) DEVICE — TRY PREP SCRB VAG PVP

## (undated) DEVICE — EVAC BLDR ELLIK 1P/U

## (undated) DEVICE — Y-TYPE TUR/BLADDER IRRIGATION SET, REGULATING CLAMP

## (undated) DEVICE — SYR LUERLOK 30CC

## (undated) DEVICE — Device

## (undated) DEVICE — SYR CATH/TIP 50ML 2OZ STRL 1P/U

## (undated) DEVICE — CUP SPECI 4OZ LF STRL

## (undated) DEVICE — STERILE POLYISOPRENE POWDER-FREE SURGICAL GLOVES: Brand: PROTEXIS

## (undated) DEVICE — MAT FLR ABS W/BLU/LINER 56X72IN WHT

## (undated) DEVICE — HF-RESECTION ELECTRODE PLASMALOOP LOOP, MEDIUM, LONG, 24 FR., 12°, PK TURIS: Brand: OLYMPUS

## (undated) DEVICE — SOL IRR NACL 0.9PCT 3000ML

## (undated) DEVICE — SLV SCD LEG COMFORT KENDALLSCD MD REPROC

## (undated) DEVICE — GOWN,REINFORCE,POLY,SIRUS,BREATH SLV,XLG: Brand: MEDLINE

## (undated) DEVICE — BLANKT WARM PACU MISTRAL/AIR PREM REFL A/ 85.8X50IN

## (undated) DEVICE — CYSTO PACK: Brand: MEDLINE INDUSTRIES, INC.